# Patient Record
Sex: FEMALE | Race: WHITE | NOT HISPANIC OR LATINO
[De-identification: names, ages, dates, MRNs, and addresses within clinical notes are randomized per-mention and may not be internally consistent; named-entity substitution may affect disease eponyms.]

---

## 2017-09-26 ENCOUNTER — APPOINTMENT (OUTPATIENT)
Dept: ORTHOPEDIC SURGERY | Facility: CLINIC | Age: 82
End: 2017-09-26
Payer: MEDICARE

## 2017-09-26 VITALS — BODY MASS INDEX: 29.88 KG/M2 | HEIGHT: 64 IN | WEIGHT: 175 LBS

## 2017-09-26 VITALS — DIASTOLIC BLOOD PRESSURE: 80 MMHG | HEART RATE: 68 BPM | SYSTOLIC BLOOD PRESSURE: 159 MMHG

## 2017-09-26 DIAGNOSIS — M54.16 RADICULOPATHY, LUMBAR REGION: ICD-10-CM

## 2017-09-26 DIAGNOSIS — Z78.9 OTHER SPECIFIED HEALTH STATUS: ICD-10-CM

## 2017-09-26 DIAGNOSIS — Z56.0 UNEMPLOYMENT, UNSPECIFIED: ICD-10-CM

## 2017-09-26 DIAGNOSIS — Z60.2 PROBLEMS RELATED TO LIVING ALONE: ICD-10-CM

## 2017-09-26 PROCEDURE — 20610 DRAIN/INJ JOINT/BURSA W/O US: CPT | Mod: LT

## 2017-09-26 PROCEDURE — 99214 OFFICE O/P EST MOD 30 MIN: CPT | Mod: 25

## 2017-09-26 PROCEDURE — 72190 X-RAY EXAM OF PELVIS: CPT

## 2017-09-26 SDOH — ECONOMIC STABILITY - INCOME SECURITY: UNEMPLOYMENT, UNSPECIFIED: Z56.0

## 2017-09-26 SDOH — SOCIAL STABILITY - SOCIAL INSECURITY: PROBLEMS RELATED TO LIVING ALONE: Z60.2

## 2019-07-29 ENCOUNTER — APPOINTMENT (OUTPATIENT)
Dept: ORTHOPEDIC SURGERY | Facility: CLINIC | Age: 84
End: 2019-07-29
Payer: MEDICARE

## 2019-07-29 VITALS — HEIGHT: 64 IN | WEIGHT: 170 LBS | BODY MASS INDEX: 29.02 KG/M2

## 2019-07-29 DIAGNOSIS — M70.61 TROCHANTERIC BURSITIS, RIGHT HIP: ICD-10-CM

## 2019-07-29 PROCEDURE — 99213 OFFICE O/P EST LOW 20 MIN: CPT

## 2019-07-29 RX ORDER — AZELASTINE HYDROCHLORIDE 137 UG/1
137 SPRAY, METERED NASAL
Qty: 90 | Refills: 0 | Status: DISCONTINUED | COMMUNITY
Start: 2019-04-29

## 2019-07-29 RX ORDER — MUPIROCIN 20 MG/G
2 OINTMENT TOPICAL
Qty: 22 | Refills: 0 | Status: DISCONTINUED | COMMUNITY
Start: 2019-04-08

## 2019-07-29 RX ORDER — METHYLPREDNISOLONE 4 MG/1
4 TABLET ORAL
Qty: 21 | Refills: 0 | Status: DISCONTINUED | COMMUNITY
Start: 2019-03-11

## 2019-07-29 RX ORDER — AMOXICILLIN AND CLAVULANATE POTASSIUM 875; 125 MG/1; MG/1
875-125 TABLET, COATED ORAL
Qty: 20 | Refills: 0 | Status: DISCONTINUED | COMMUNITY
Start: 2019-04-08

## 2019-08-21 ENCOUNTER — APPOINTMENT (OUTPATIENT)
Dept: ORTHOPEDIC SURGERY | Facility: CLINIC | Age: 84
End: 2019-08-21

## 2019-10-02 PROBLEM — Z60.2 PERSON LIVING ALONE: Status: ACTIVE | Noted: 2017-09-26

## 2021-03-16 ENCOUNTER — INPATIENT (INPATIENT)
Facility: HOSPITAL | Age: 86
LOS: 2 days | Discharge: HOME | DRG: 149 | End: 2021-03-19
Attending: HOSPITALIST | Admitting: STUDENT IN AN ORGANIZED HEALTH CARE EDUCATION/TRAINING PROGRAM
Payer: MEDICARE

## 2021-03-16 VITALS
DIASTOLIC BLOOD PRESSURE: 94 MMHG | WEIGHT: 160.06 LBS | SYSTOLIC BLOOD PRESSURE: 165 MMHG | OXYGEN SATURATION: 97 % | HEIGHT: 64 IN | TEMPERATURE: 98 F | RESPIRATION RATE: 20 BRPM | HEART RATE: 75 BPM

## 2021-03-16 DIAGNOSIS — R42 DIZZINESS AND GIDDINESS: ICD-10-CM

## 2021-03-16 LAB
ALBUMIN SERPL ELPH-MCNC: 4 G/DL — SIGNIFICANT CHANGE UP (ref 3.3–5)
ALP SERPL-CCNC: 27 U/L — LOW (ref 40–120)
ALT FLD-CCNC: 13 U/L — SIGNIFICANT CHANGE UP (ref 10–45)
ANION GAP SERPL CALC-SCNC: 11 MMOL/L — SIGNIFICANT CHANGE UP (ref 5–17)
APPEARANCE UR: CLEAR — SIGNIFICANT CHANGE UP
APTT BLD: 32.4 SEC — SIGNIFICANT CHANGE UP (ref 27.5–35.5)
AST SERPL-CCNC: 27 U/L — SIGNIFICANT CHANGE UP (ref 10–40)
BASOPHILS # BLD AUTO: 0.03 K/UL — SIGNIFICANT CHANGE UP (ref 0–0.2)
BASOPHILS NFR BLD AUTO: 0.5 % — SIGNIFICANT CHANGE UP (ref 0–2)
BILIRUB SERPL-MCNC: 0.7 MG/DL — SIGNIFICANT CHANGE UP (ref 0.2–1.2)
BILIRUB UR-MCNC: NEGATIVE — SIGNIFICANT CHANGE UP
BUN SERPL-MCNC: 23 MG/DL — SIGNIFICANT CHANGE UP (ref 7–23)
CALCIUM SERPL-MCNC: 9.9 MG/DL — SIGNIFICANT CHANGE UP (ref 8.4–10.5)
CHLORIDE SERPL-SCNC: 107 MMOL/L — SIGNIFICANT CHANGE UP (ref 96–108)
CO2 SERPL-SCNC: 22 MMOL/L — SIGNIFICANT CHANGE UP (ref 22–31)
COLOR SPEC: COLORLESS — SIGNIFICANT CHANGE UP
CREAT SERPL-MCNC: 1.14 MG/DL — SIGNIFICANT CHANGE UP (ref 0.5–1.3)
DIFF PNL FLD: NEGATIVE — SIGNIFICANT CHANGE UP
EOSINOPHIL # BLD AUTO: 0.06 K/UL — SIGNIFICANT CHANGE UP (ref 0–0.5)
EOSINOPHIL NFR BLD AUTO: 1 % — SIGNIFICANT CHANGE UP (ref 0–6)
GLUCOSE BLDC GLUCOMTR-MCNC: 95 MG/DL — SIGNIFICANT CHANGE UP (ref 70–99)
GLUCOSE SERPL-MCNC: 100 MG/DL — HIGH (ref 70–99)
GLUCOSE UR QL: NEGATIVE — SIGNIFICANT CHANGE UP
HCT VFR BLD CALC: 39.9 % — SIGNIFICANT CHANGE UP (ref 34.5–45)
HGB BLD-MCNC: 12.4 G/DL — SIGNIFICANT CHANGE UP (ref 11.5–15.5)
IMM GRANULOCYTES NFR BLD AUTO: 0.3 % — SIGNIFICANT CHANGE UP (ref 0–1.5)
INR BLD: 1.11 RATIO — SIGNIFICANT CHANGE UP (ref 0.88–1.16)
KETONES UR-MCNC: NEGATIVE — SIGNIFICANT CHANGE UP
LEUKOCYTE ESTERASE UR-ACNC: NEGATIVE — SIGNIFICANT CHANGE UP
LYMPHOCYTES # BLD AUTO: 1.08 K/UL — SIGNIFICANT CHANGE UP (ref 1–3.3)
LYMPHOCYTES # BLD AUTO: 18.5 % — SIGNIFICANT CHANGE UP (ref 13–44)
MAGNESIUM SERPL-MCNC: 2 MG/DL — SIGNIFICANT CHANGE UP (ref 1.6–2.6)
MCHC RBC-ENTMCNC: 25.4 PG — LOW (ref 27–34)
MCHC RBC-ENTMCNC: 31.1 GM/DL — LOW (ref 32–36)
MCV RBC AUTO: 81.8 FL — SIGNIFICANT CHANGE UP (ref 80–100)
MONOCYTES # BLD AUTO: 0.52 K/UL — SIGNIFICANT CHANGE UP (ref 0–0.9)
MONOCYTES NFR BLD AUTO: 8.9 % — SIGNIFICANT CHANGE UP (ref 2–14)
NEUTROPHILS # BLD AUTO: 4.12 K/UL — SIGNIFICANT CHANGE UP (ref 1.8–7.4)
NEUTROPHILS NFR BLD AUTO: 70.8 % — SIGNIFICANT CHANGE UP (ref 43–77)
NITRITE UR-MCNC: NEGATIVE — SIGNIFICANT CHANGE UP
NRBC # BLD: 0 /100 WBCS — SIGNIFICANT CHANGE UP (ref 0–0)
NT-PROBNP SERPL-SCNC: 93 PG/ML — SIGNIFICANT CHANGE UP (ref 0–300)
PH UR: 6.5 — SIGNIFICANT CHANGE UP (ref 5–8)
PHOSPHATE SERPL-MCNC: 3.3 MG/DL — SIGNIFICANT CHANGE UP (ref 2.5–4.5)
PLATELET # BLD AUTO: 152 K/UL — SIGNIFICANT CHANGE UP (ref 150–400)
POTASSIUM SERPL-MCNC: 5.2 MMOL/L — SIGNIFICANT CHANGE UP (ref 3.5–5.3)
POTASSIUM SERPL-SCNC: 5.2 MMOL/L — SIGNIFICANT CHANGE UP (ref 3.5–5.3)
PROT SERPL-MCNC: 7 G/DL — SIGNIFICANT CHANGE UP (ref 6–8.3)
PROT UR-MCNC: NEGATIVE — SIGNIFICANT CHANGE UP
PROTHROM AB SERPL-ACNC: 13.3 SEC — SIGNIFICANT CHANGE UP (ref 10.6–13.6)
RBC # BLD: 4.88 M/UL — SIGNIFICANT CHANGE UP (ref 3.8–5.2)
RBC # FLD: 14.7 % — HIGH (ref 10.3–14.5)
SODIUM SERPL-SCNC: 140 MMOL/L — SIGNIFICANT CHANGE UP (ref 135–145)
SP GR SPEC: 1.02 — SIGNIFICANT CHANGE UP (ref 1.01–1.02)
TROPONIN T, HIGH SENSITIVITY RESULT: 22 NG/L — SIGNIFICANT CHANGE UP (ref 0–51)
UROBILINOGEN FLD QL: NEGATIVE — SIGNIFICANT CHANGE UP
WBC # BLD: 5.83 K/UL — SIGNIFICANT CHANGE UP (ref 3.8–10.5)
WBC # FLD AUTO: 5.83 K/UL — SIGNIFICANT CHANGE UP (ref 3.8–10.5)

## 2021-03-16 PROCEDURE — 70498 CT ANGIOGRAPHY NECK: CPT | Mod: 26,MA

## 2021-03-16 PROCEDURE — 99223 1ST HOSP IP/OBS HIGH 75: CPT | Mod: GC

## 2021-03-16 PROCEDURE — 71045 X-RAY EXAM CHEST 1 VIEW: CPT | Mod: 26

## 2021-03-16 PROCEDURE — 99285 EMERGENCY DEPT VISIT HI MDM: CPT | Mod: CS,GC

## 2021-03-16 PROCEDURE — 70496 CT ANGIOGRAPHY HEAD: CPT | Mod: 26,MA

## 2021-03-16 PROCEDURE — 93010 ELECTROCARDIOGRAM REPORT: CPT | Mod: GC

## 2021-03-16 RX ORDER — DEXTROSE 50 % IN WATER 50 %
25 SYRINGE (ML) INTRAVENOUS ONCE
Refills: 0 | Status: DISCONTINUED | OUTPATIENT
Start: 2021-03-16 | End: 2021-03-19

## 2021-03-16 RX ORDER — SODIUM CHLORIDE 9 MG/ML
1000 INJECTION, SOLUTION INTRAVENOUS
Refills: 0 | Status: DISCONTINUED | OUTPATIENT
Start: 2021-03-16 | End: 2021-03-19

## 2021-03-16 RX ORDER — INSULIN LISPRO 100/ML
VIAL (ML) SUBCUTANEOUS
Refills: 0 | Status: DISCONTINUED | OUTPATIENT
Start: 2021-03-16 | End: 2021-03-19

## 2021-03-16 RX ORDER — INSULIN LISPRO 100/ML
VIAL (ML) SUBCUTANEOUS AT BEDTIME
Refills: 0 | Status: DISCONTINUED | OUTPATIENT
Start: 2021-03-16 | End: 2021-03-19

## 2021-03-16 RX ORDER — GLUCAGON INJECTION, SOLUTION 0.5 MG/.1ML
1 INJECTION, SOLUTION SUBCUTANEOUS ONCE
Refills: 0 | Status: DISCONTINUED | OUTPATIENT
Start: 2021-03-16 | End: 2021-03-19

## 2021-03-16 RX ORDER — DEXTROSE 50 % IN WATER 50 %
15 SYRINGE (ML) INTRAVENOUS ONCE
Refills: 0 | Status: DISCONTINUED | OUTPATIENT
Start: 2021-03-16 | End: 2021-03-19

## 2021-03-16 RX ORDER — DEXTROSE 50 % IN WATER 50 %
12.5 SYRINGE (ML) INTRAVENOUS ONCE
Refills: 0 | Status: DISCONTINUED | OUTPATIENT
Start: 2021-03-16 | End: 2021-03-19

## 2021-03-16 NOTE — ED PROVIDER NOTE - PHYSICAL EXAMINATION
GENERAL: no acute distress, non-toxic appearing  HEAD: normocephalic, atraumatic  HEENT: normal conjunctiva, oral mucosa moist, neck supple  CARDIAC: regular rate and rhythm, normal S1 and S2,  no appreciable murmurs  PULM: clear to ascultation bilaterally, no crackles, rales, rhonchi, or wheezing  GI: abdomen nondistended, soft, nontender, no guarding or rebound tenderness  : no CVA tenderness, no suprapubic tenderness    NEURO: CN2-12 grossly intact, A&Ox4, MS +5/5 in UE and LE BL, finger to nose smooth and rapid, gross sensation intact in UE and LE BL, negative pronator drift, unsteady gait      MSK: no visible deformities, no peripheral edema, calf tenderness/redness/swelling  SKIN: no visible rashes, dry, well-perfused  PSYCH: appropriate mood and affect

## 2021-03-16 NOTE — H&P ADULT - PROBLEM SELECTOR PLAN 4
- c/w home lisinopril 5mg and metoprolol 25mg - CHADSVASC: 5 (Age, sex, HTN, diabetes)  - c/w xarelto 15mg

## 2021-03-16 NOTE — ED ADULT NURSE NOTE - OBJECTIVE STATEMENT
88 y/o F A&Ox3 PMH spinal stenosis, osteoporosis, hypothyroid, HTN, HLD total knee replacement presents to the ED from home c/o dizziness. Pt reports feeling one episode of dizziness & weakness approx 2 weeks ago. Pt states the episode spontaneously resolved. Pt states this morning she had a similar episode where she became dizzy & weak. pt states this episode lasted longer than the previous one. Pt PCP advised pt to be seen in the ED for further treatment & evaluation. Pt has pacemaker, pt stated outpatient MD told patient something "happened with her pacemaker during both episodes". Upon arrival to ED pt is well appearing. Breathing is even and unlabored, satting 100% RA. Skin is warm, dry & intact. EKG completed at bedside, placed on CM- NSR. Pt denies CP, SOB, N/V/D, numbness, tingling, fevers, chills. IV access obtained. Call bell within reach, comfort & safety provided.

## 2021-03-16 NOTE — ED PROVIDER NOTE - NS ED ROS FT
GENERAL: no fever, chills, (+) dizziness   HEENT: no cough, congestion, odynophagia, dysphagia  CARDIAC: no chest pain, palpitations, lightheadedness  PULM: no dyspnea, wheezing   GI: no abdominal pain, nausea, vomiting, diarrhea, constipation, melena, hematochezia  : no urinary dysuria, frequency, incontinence, hematuria  NEURO: no headache, motor weakness, sensory changes  MSK: no joint or muscle pain  SKIN: no rashes, hives  HEME: no active bleeding, bruising

## 2021-03-16 NOTE — H&P ADULT - ATTENDING COMMENTS
I was physically present for the key portions of the evaluation and management (E/M) service provided.  I agree with the above history, physical, and plan which I have reviewed and edited where appropriate.     Plan discussed with Patient, RN    89 yo F with a pmhx of DM, HTN, hyperlipidemia, asthma, afib on eliquis, ILR tachy-inocencia syndrome s/p PPM, hypothyroidism presenting with complaints of weakness and dizziness likely 2/2 to arrhythmia (intermittent SVT).  Monitor electrolytes, check TSH.  Monitor on telemetry.  Cardiology consult in AM.

## 2021-03-16 NOTE — H&P ADULT - ASSESSMENT
89 yo F with a pmhx of DM, HTN, hyperlipidemia, asthma, afib on eliquis, ILR tachy-inocencia syndrome s/p PPM, hypothyroidism presenting with complaints of weakness and dizziness for the past week.  89 yo F with a pmhx of DM, HTN, hyperlipidemia, asthma, afib on eliquis, ILR tachy-inocencia syndrome s/p PPM, hypothyroidism presenting with complaints of weakness and dizziness for the past week. Found to have multiple runs of SVT on outpatient cardiologist device interrogation.

## 2021-03-16 NOTE — ED PROVIDER NOTE - OBJECTIVE STATEMENT
88 yo F with a pmhx of DM, HTN, hyperlipidemia, asthma, afib on eliquis, ILR tachy-inocencia syndrome s/p PPM, hypothyroidism here for dizziness over the past week. She had prior episodes of dizziness; cannot identify any triggers. She describes her dizziness as the room spinning around her. She admits to associated nausea with the episodes. She denies any CP, SOB, abdominal pain, V/D, fnd, numbness or tingling.  She had outpatient interrogation performed by Diaz Goddard which demonstrated:   "multiple runs of SVT at 170 bpm longest 58 seconds on pacemakers. patient is not orthostatic"

## 2021-03-16 NOTE — ED PROVIDER NOTE - CLINICAL SUMMARY MEDICAL DECISION MAKING FREE TEXT BOX
88 yo F with a pmhx of DM, HTN, hyperlipidemia, asthma, afib on eliquis, ILR tachy-inocencia syndrome s/p PPM, hypothyroidism here for dizziness over the past week. intermittent episodes without any idenifiable triggers. VSS. neuro exam is remarkable for unsteady gait. other parts of neuro exam is unremarkable. no pronator drift, no weakness, no sensation changes. dizziness likely related to cardiac origin, will order CTA head/neck to r/o cranial pathology. labs, TBA

## 2021-03-16 NOTE — ED PROVIDER NOTE - ATTENDING CONTRIBUTION TO CARE
Patient is an 89 yo F with history of DM, HTN, hyperlipidemia, asthma, afib, ILR tachy-inocencia syndrome s/p PPM, hypothyroidism here for dizziness over the past week. She was evaluated by Dr. Diaz Goddard, found patient to have multiple runs of SVTs to 170 bpm. Patient was sent in for evaluation for Watchman's device due to near syncopal episodes.     VS noted  Gen. no acute distress, Non toxic   HEENT: EOMI, mmm  Lungs: CTAB/L no C/ W /R   CVS: RRR   Abd; Soft non tender, non distended   Ext: no edema  Skin: no rash  Neuro AAOx3 non focal clear speech  a/p:   - Oliverio IGLESIAS Patient is an 89 yo F with history of DM, HTN, hyperlipidemia, asthma, afib, ILR tachy-inocencia syndrome s/p PPM, hypothyroidism here for dizziness over the past week. She was evaluated by Dr. Diaz Goddard, found patient to have multiple runs of SVTs to 170 bpm. Patient was sent in for evaluation for Watchman's device due to near syncopal episodes.     VS noted  Gen. no acute distress, Non toxic   HEENT: EOMI, mmm  Lungs: CTAB/L no C/ W /R   CVS: RRR   Abd; Soft non tender, non distended   Ext: no edema  Skin: no rash  Neuro AAOx3, CN 2-12 intact, strength equal in UE/LE, gait unsteady, clear speech  a/p: dizziness - run of SVTS - based on interrogation done by Dr. Goddard in the office. However, patient with unsteady gait for a few days. At baseline, walks without assistance. Plan for labs, CT Head, CTA H&N.   - Oliverio IGLESIAS Patient is an 88 yo F with history of DM, HTN, hyperlipidemia, asthma, afib, ILR tachy-inocencia syndrome s/p PPM, hypothyroidism here for dizziness over the past week. She was evaluated by Dr. Diaz Goddard, found patient to have multiple runs of SVTs to 170 bpm. Patient was sent in for evaluation for Watchman's device due to near syncopal episodes.     VS noted  Gen. no acute distress, Non toxic   HEENT: EOMI, mmm  Lungs: CTAB/L no C/ W /R   CVS: RRR   Abd; Soft non tender, non distended   Ext: no edema  Skin: no rash  Neuro AAOx3, CN 2-12 intact, strength equal in UE/LE, gait unsteady, clear speech  a/p: dizziness - run of SVTS - based on interrogation done by Dr. Goddard in the office. However, patient with unsteady gait for a few days. At baseline, walks without assistance. r/o CVA, Plan for labs CT Head, CTA H&N.   - Oliverio IGLESIAS

## 2021-03-16 NOTE — ED PROVIDER NOTE - PMH
Asthma    Female Stress Incontinence    Hyperlipidemia    Hypertension    Hypothyroid    Lumbar Spinal Stenosis    Lumbosacral Neuritis    Osteoporosis    Osteoporosis    Pneumonia  11/10

## 2021-03-16 NOTE — ED ADULT NURSE NOTE - NSIMPLEMENTINTERV_GEN_ALL_ED
Implemented All Fall with Harm Risk Interventions:  [place] to call system. Call bell, personal items and telephone within reach. Instruct patient to call for assistance. Room bathroom lighting operational. Non-slip footwear when patient is off stretcher. Physically safe environment: no spills, clutter or unnecessary equipment. Stretcher in lowest position, wheels locked, appropriate side rails in place. Provide visual cue, wrist band, yellow gown, etc. Monitor gait and stability. Monitor for mental status changes and reorient to person, place, and time. Review medications for side effects contributing to fall risk. Reinforce activity limits and safety measures with patient and family. Provide visual clues: red socks.

## 2021-03-16 NOTE — H&P ADULT - NSHPLABSRESULTS_GEN_ALL_CORE
12.4   5.83  )-----------( 152      ( 16 Mar 2021 16:09 )             39.9       03-16    140  |  107  |  23  ----------------------------<  100<H>  5.2   |  22  |  1.14    Ca    9.9      16 Mar 2021 16:09  Phos  3.3     03-16  Mg     2.0     03-16    TPro  7.0  /  Alb  4.0  /  TBili  0.7  /  DBili  x   /  AST  27  /  ALT  13  /  AlkPhos  27<L>  03-16              Urinalysis Basic - ( 16 Mar 2021 17:44 )    Color: Colorless / Appearance: Clear / S.016 / pH: x  Gluc: x / Ketone: Negative  / Bili: Negative / Urobili: Negative   Blood: x / Protein: Negative / Nitrite: Negative   Leuk Esterase: Negative / RBC: x / WBC x   Sq Epi: x / Non Sq Epi: x / Bacteria: x        PT/INR - ( 16 Mar 2021 16:09 )   PT: 13.3 sec;   INR: 1.11 ratio         PTT - ( 16 Mar 2021 16:09 )  PTT:32.4 sec    Troponin: 22      POCT Blood Glucose.: 95 mg/dL (16 Mar 2021 23:32) 12.4   5.83  )-----------( 152      ( 16 Mar 2021 16:09 )             39.9       03-16    140  |  107  |  23  ----------------------------<  100<H>  5.2   |  22  |  1.14    Ca    9.9      16 Mar 2021 16:09  Phos  3.3     03-16  Mg     2.0     03-16    TPro  7.0  /  Alb  4.0  /  TBili  0.7  /  DBili  x   /  AST  27  /  ALT  13  /  AlkPhos  27<L>  03-16              Urinalysis Basic - ( 16 Mar 2021 17:44 )    Color: Colorless / Appearance: Clear / S.016 / pH: x  Gluc: x / Ketone: Negative  / Bili: Negative / Urobili: Negative   Blood: x / Protein: Negative / Nitrite: Negative   Leuk Esterase: Negative / RBC: x / WBC x   Sq Epi: x / Non Sq Epi: x / Bacteria: x        PT/INR - ( 16 Mar 2021 16:09 )   PT: 13.3 sec;   INR: 1.11 ratio         PTT - ( 16 Mar 2021 16:09 )  PTT:32.4 sec    Troponin: 22  BNP: 93    POCT Blood Glucose.: 95 mg/dL (16 Mar 2021 23:32)    < from: CT Head No Cont (21 @ 17:19) >    FINDINGS:    CT BRAIN:    No acute intracranial hemorrhage, mass effect, or midline shift. No abnormal extra-axial fluid collections. No acute cerebral cortical infarct. The basal cisterns are patent without evidence of central herniation. No hydrocephalus.    Moderate cerebral volume loss with proportional prominence of the sulci and ventricles. Patchy periventricular white matter hypoattenuation, likely related to chronic microvascular ischemic disease.    The calvarium is intact. The soft tissues of the scalp are unremarkable.  The visualized paranasal sinuses are clear. The mastoid air cells are clear.      CT ANGIOGRAPHY NECK:    Three-vessel aortic arch. The origins of the great vessels are unremarkable. The common carotid arteries are normal in caliber, significant stenosis.    The carotid bifurcations demonstrate mild atherosclerotic changes. The internal carotid arteries demonstrate mild atherosclerotic changes, with less than 50% stenosis.    Co-dominant vertebral arteries. The vertebral arteries are normal in caliber without significant stenosis. Left side dominant.    Single cyst within the left upper lobe (series 2, image 29). 1.0 x 0.8 cm right thyroid lobe nodule (series 2, image 57). Subcentimeter mediastinal lymph node (series 2, image 22). Visualized osseous structuresare unremarkable.      CT ANGIOGRAPHY BRAIN:    Anterior circulation: Calcifications of the carotid siphons bilaterally. The bilateral anterior cerebral and middle cerebral arteries are patent without evidence of significant stenosis, major vessel occlusion, or aneurysm.    Posterior circulation: The distal vertebral arteries, basilar artery, and bilateral posterior cerebral arteries are patent without evidence of significant stenosis, major vessel occlusion, or aneurysm.    No enlarged vascular lesions or clusters of abnormal vessels are noted to suggest an arterial venous malformation.    Visualized portions of the superficial and deep venous systems are unremarkable.      IMPRESSION:    CT brain: No acute intracranial hemorrhage, mass effect, midline shift or acute cerebral cortical infarct.    CT angiography neck: Less than 50% bilaterally ICA stenosis by NASCET criteria. No vascular dissection. 1.0 x 0.8 cm right thyroid lobe nodule. Recommend nonemergent thyroid ultrasound as clinically warranted    CT angiography brain: No major vessel occlusion or proximal stenosis. No evidence of aneurysm or other vascular malformation.    < end of copied text > 12.4   5.83  )-----------( 152      ( 16 Mar 2021 16:09 )             39.9       03-16    140  |  107  |  23  ----------------------------<  100<H>  5.2   |  22  |  1.14    Ca    9.9      16 Mar 2021 16:09  Phos  3.3     03-16  Mg     2.0     -16    TPro  7.0  /  Alb  4.0  /  TBili  0.7  /  DBili  x   /  AST  27  /  ALT  13  /  AlkPhos  27<L>  03-16              Urinalysis Basic - ( 16 Mar 2021 17:44 )    Color: Colorless / Appearance: Clear / S.016 / pH: x  Gluc: x / Ketone: Negative  / Bili: Negative / Urobili: Negative   Blood: x / Protein: Negative / Nitrite: Negative   Leuk Esterase: Negative / RBC: x / WBC x   Sq Epi: x / Non Sq Epi: x / Bacteria: x        PT/INR - ( 16 Mar 2021 16:09 )   PT: 13.3 sec;   INR: 1.11 ratio         PTT - ( 16 Mar 2021 16:09 )  PTT:32.4 sec    Troponin: 22  BNP: 93    POCT Blood Glucose.: 95 mg/dL (16 Mar 2021 23:32)    EKG: NSR,     < from: CT Head No Cont (21 @ 17:19) >    FINDINGS:    CT BRAIN:    No acute intracranial hemorrhage, mass effect, or midline shift. No abnormal extra-axial fluid collections. No acute cerebral cortical infarct. The basal cisterns are patent without evidence of central herniation. No hydrocephalus.    Moderate cerebral volume loss with proportional prominence of the sulci and ventricles. Patchy periventricular white matter hypoattenuation, likely related to chronic microvascular ischemic disease.    The calvarium is intact. The soft tissues of the scalp are unremarkable.  The visualized paranasal sinuses are clear. The mastoid air cells are clear.      CT ANGIOGRAPHY NECK:    Three-vessel aortic arch. The origins of the great vessels are unremarkable. The common carotid arteries are normal in caliber, significant stenosis.    The carotid bifurcations demonstrate mild atherosclerotic changes. The internal carotid arteries demonstrate mild atherosclerotic changes, with less than 50% stenosis.    Co-dominant vertebral arteries. The vertebral arteries are normal in caliber without significant stenosis. Left side dominant.    Single cyst within the left upper lobe (series 2, image 29). 1.0 x 0.8 cm right thyroid lobe nodule (series 2, image 57). Subcentimeter mediastinal lymph node (series 2, image 22). Visualized osseous structuresare unremarkable.      CT ANGIOGRAPHY BRAIN:    Anterior circulation: Calcifications of the carotid siphons bilaterally. The bilateral anterior cerebral and middle cerebral arteries are patent without evidence of significant stenosis, major vessel occlusion, or aneurysm.    Posterior circulation: The distal vertebral arteries, basilar artery, and bilateral posterior cerebral arteries are patent without evidence of significant stenosis, major vessel occlusion, or aneurysm.    No enlarged vascular lesions or clusters of abnormal vessels are noted to suggest an arterial venous malformation.    Visualized portions of the superficial and deep venous systems are unremarkable.      IMPRESSION:    CT brain: No acute intracranial hemorrhage, mass effect, midline shift or acute cerebral cortical infarct.    CT angiography neck: Less than 50% bilaterally ICA stenosis by NASCET criteria. No vascular dissection. 1.0 x 0.8 cm right thyroid lobe nodule. Recommend nonemergent thyroid ultrasound as clinically warranted    CT angiography brain: No major vessel occlusion or proximal stenosis. No evidence of aneurysm or other vascular malformation.    < end of copied text > 12.4   5.83  )-----------( 152      ( 16 Mar 2021 16:09 )             39.9       03-16    140  |  107  |  23  ----------------------------<  100<H>  5.2   |  22  |  1.14    Ca    9.9      16 Mar 2021 16:09  Phos  3.3     03-16  Mg     2.0     03-16    TPro  7.0  /  Alb  4.0  /  TBili  0.7  /  DBili  x   /  AST  27  /  ALT  13  /  AlkPhos  27<L>  03-16              Urinalysis Basic - ( 16 Mar 2021 17:44 )    Color: Colorless / Appearance: Clear / S.016 / pH: x  Gluc: x / Ketone: Negative  / Bili: Negative / Urobili: Negative   Blood: x / Protein: Negative / Nitrite: Negative   Leuk Esterase: Negative / RBC: x / WBC x   Sq Epi: x / Non Sq Epi: x / Bacteria: x        PT/INR - ( 16 Mar 2021 16:09 )   PT: 13.3 sec;   INR: 1.11 ratio         PTT - ( 16 Mar 2021 16:09 )  PTT:32.4 sec    Troponin: 22  BNP: 93    POCT Blood Glucose.: 95 mg/dL (16 Mar 2021 23:32)    EKG: NSR    < from: CT Head No Cont (21 @ 17:19) >    FINDINGS:    CT BRAIN:    No acute intracranial hemorrhage, mass effect, or midline shift. No abnormal extra-axial fluid collections. No acute cerebral cortical infarct. The basal cisterns are patent without evidence of central herniation. No hydrocephalus.    Moderate cerebral volume loss with proportional prominence of the sulci and ventricles. Patchy periventricular white matter hypoattenuation, likely related to chronic microvascular ischemic disease.    The calvarium is intact. The soft tissues of the scalp are unremarkable.  The visualized paranasal sinuses are clear. The mastoid air cells are clear.      CT ANGIOGRAPHY NECK:    Three-vessel aortic arch. The origins of the great vessels are unremarkable. The common carotid arteries are normal in caliber, significant stenosis.    The carotid bifurcations demonstrate mild atherosclerotic changes. The internal carotid arteries demonstrate mild atherosclerotic changes, with less than 50% stenosis.    Co-dominant vertebral arteries. The vertebral arteries are normal in caliber without significant stenosis. Left side dominant.    Single cyst within the left upper lobe (series 2, image 29). 1.0 x 0.8 cm right thyroid lobe nodule (series 2, image 57). Subcentimeter mediastinal lymph node (series 2, image 22). Visualized osseous structuresare unremarkable.      CT ANGIOGRAPHY BRAIN:    Anterior circulation: Calcifications of the carotid siphons bilaterally. The bilateral anterior cerebral and middle cerebral arteries are patent without evidence of significant stenosis, major vessel occlusion, or aneurysm.    Posterior circulation: The distal vertebral arteries, basilar artery, and bilateral posterior cerebral arteries are patent without evidence of significant stenosis, major vessel occlusion, or aneurysm.    No enlarged vascular lesions or clusters of abnormal vessels are noted to suggest an arterial venous malformation.    Visualized portions of the superficial and deep venous systems are unremarkable.      IMPRESSION:    CT brain: No acute intracranial hemorrhage, mass effect, midline shift or acute cerebral cortical infarct.    CT angiography neck: Less than 50% bilaterally ICA stenosis by NASCET criteria. No vascular dissection. 1.0 x 0.8 cm right thyroid lobe nodule. Recommend nonemergent thyroid ultrasound as clinically warranted    CT angiography brain: No major vessel occlusion or proximal stenosis. No evidence of aneurysm or other vascular malformation.    < end of copied text > Labs, imaging, EKG reviewed    12.4   5.83  )-----------( 152      ( 16 Mar 2021 16:09 )             39.9       03-16    140  |  107  |  23  ----------------------------<  100<H>  5.2   |  22  |  1.14    Ca    9.9      16 Mar 2021 16:09  Phos  3.3     03-16  Mg     2.0     -16    TPro  7.0  /  Alb  4.0  /  TBili  0.7  /  DBili  x   /  AST  27  /  ALT  13  /  AlkPhos  27<L>  03-16              Urinalysis Basic - ( 16 Mar 2021 17:44 )    Color: Colorless / Appearance: Clear / S.016 / pH: x  Gluc: x / Ketone: Negative  / Bili: Negative / Urobili: Negative   Blood: x / Protein: Negative / Nitrite: Negative   Leuk Esterase: Negative / RBC: x / WBC x   Sq Epi: x / Non Sq Epi: x / Bacteria: x        PT/INR - ( 16 Mar 2021 16:09 )   PT: 13.3 sec;   INR: 1.11 ratio         PTT - ( 16 Mar 2021 16:09 )  PTT:32.4 sec    Troponin: 22  BNP: 93    POCT Blood Glucose.: 95 mg/dL (16 Mar 2021 23:32)    EKG: NSR    < from: CT Head No Cont (21 @ 17:19) >    FINDINGS:    CT BRAIN:    No acute intracranial hemorrhage, mass effect, or midline shift. No abnormal extra-axial fluid collections. No acute cerebral cortical infarct. The basal cisterns are patent without evidence of central herniation. No hydrocephalus.    Moderate cerebral volume loss with proportional prominence of the sulci and ventricles. Patchy periventricular white matter hypoattenuation, likely related to chronic microvascular ischemic disease.    The calvarium is intact. The soft tissues of the scalp are unremarkable.  The visualized paranasal sinuses are clear. The mastoid air cells are clear.      CT ANGIOGRAPHY NECK:    Three-vessel aortic arch. The origins of the great vessels are unremarkable. The common carotid arteries are normal in caliber, significant stenosis.    The carotid bifurcations demonstrate mild atherosclerotic changes. The internal carotid arteries demonstrate mild atherosclerotic changes, with less than 50% stenosis.    Co-dominant vertebral arteries. The vertebral arteries are normal in caliber without significant stenosis. Left side dominant.    Single cyst within the left upper lobe (series 2, image 29). 1.0 x 0.8 cm right thyroid lobe nodule (series 2, image 57). Subcentimeter mediastinal lymph node (series 2, image 22). Visualized osseous structuresare unremarkable.      CT ANGIOGRAPHY BRAIN:    Anterior circulation: Calcifications of the carotid siphons bilaterally. The bilateral anterior cerebral and middle cerebral arteries are patent without evidence of significant stenosis, major vessel occlusion, or aneurysm.    Posterior circulation: The distal vertebral arteries, basilar artery, and bilateral posterior cerebral arteries are patent without evidence of significant stenosis, major vessel occlusion, or aneurysm.    No enlarged vascular lesions or clusters of abnormal vessels are noted to suggest an arterial venous malformation.    Visualized portions of the superficial and deep venous systems are unremarkable.      IMPRESSION:    CT brain: No acute intracranial hemorrhage, mass effect, midline shift or acute cerebral cortical infarct.    CT angiography neck: Less than 50% bilaterally ICA stenosis by NASCET criteria. No vascular dissection. 1.0 x 0.8 cm right thyroid lobe nodule. Recommend nonemergent thyroid ultrasound as clinically warranted    CT angiography brain: No major vessel occlusion or proximal stenosis. No evidence of aneurysm or other vascular malformation.    < end of copied text >

## 2021-03-16 NOTE — H&P ADULT - PROBLEM SELECTOR PLAN 6
- c/w home levothyroxine  - f/u TSH in AM - Patient on Jardiance at home. Will reach out to pharmacy in AM for dosage  - low ISS for now  - f/u A1c in AM.

## 2021-03-16 NOTE — H&P ADULT - PROBLEM SELECTOR PLAN 1
-SVT noted on OP cardiology interrogation.   - f/u cardiology consult -SVT noted on OP cardiology interrogation. Patient subsequently referred to ED.   - c/w telemetry monitoring  - f/u cardiology consult ( Dr. Moo Hills, referred by Dr. Diaz Goddard) - Endorses periodic dizziness and presyncope. Like etiology 2/2 runs of SVTs on interrogation by OP cardiologist, etiology is likely cardiac in nature. Differential also includes orthostatis vs vertigo. CT imaging w/o evidence of acute hemorrhagic or ischemic stroke.   - orthostatic vitals  - fall precautions  - c/w telemetry monitoring  - cardiology consult (Dr. Moo [place], private cardiology)

## 2021-03-16 NOTE — PATIENT PROFILE ADULT - NSPROHMSYMPCOND_GEN_A_NUR
at bedside. Patient states they already watched discharge education video. Reviewed prevention of constipation side effect  from narcotics. Teachback done again on ankle pumps and incentive spriometry use.  Reviewed dressing changes, showering and c cardiovascular/diabetes

## 2021-03-16 NOTE — H&P ADULT - PROBLEM SELECTOR PLAN 5
- Patient on Jardiance at home. Will reach out to pharmacy in AM for dosage  - low ISS for now  - f/u A1c in AM. - c/w home lisinopril 5mg and metoprolol 25mg

## 2021-03-16 NOTE — H&P ADULT - NSHPPHYSICALEXAM_GEN_ALL_CORE
ICU Vital Signs Last 24 Hrs  T(C): 36.8 (16 Mar 2021 22:19), Max: 36.9 (16 Mar 2021 18:15)  T(F): 98.2 (16 Mar 2021 22:19), Max: 98.4 (16 Mar 2021 18:15)  HR: 74 (16 Mar 2021 22:19) (68 - 75)  BP: 142/83 (16 Mar 2021 22:19) (142/83 - 165/94)  BP(mean): --  ABP: --  ABP(mean): --  RR: 16 (16 Mar 2021 22:19) (16 - 20)  SpO2: 97% (16 Mar 2021 22:19) (97% - 100%) ICU Vital Signs Last 24 Hrs  T(C): 36.8 (16 Mar 2021 22:19), Max: 36.9 (16 Mar 2021 18:15)  T(F): 98.2 (16 Mar 2021 22:19), Max: 98.4 (16 Mar 2021 18:15)  HR: 74 (16 Mar 2021 22:19) (68 - 75)  BP: 142/83 (16 Mar 2021 22:19) (142/83 - 165/94)  BP(mean): --  ABP: --  ABP(mean): --  RR: 16 (16 Mar 2021 22:19) (16 - 20)  SpO2: 97% (16 Mar 2021 22:19) (97% - 100%)    PHYSICAL EXAM:  GENERAL: NAD, Resting in bed  HEENT:  Head atraumatic, EOMI, PERRLA, conjunctiva and sclera clear; Moist mucous membranes, normal oropharynx  NECK: Supple, No JVD, no lymphadenopathy, no thyroid nodules or enlargement  CHEST/LUNG: Clear to auscultation bilaterally; No rales, rhonchi, wheezing, or rubs. Unlabored respirations on room air  HEART: Regular rate and rhythm; No murmurs, rubs, or gallops  ABDOMEN: Bowel sounds present; Soft, Nontender, Nondistended. No hepatomegally  EXTREMITIES:  2+ Peripheral Pulses, brisk capillary refill. No clubbing, cyanosis, or edema  NERVOUS SYSTEM:  Alert & Oriented X3, non-focal and spontaneous movements of all extremities  SKIN: No rashes or lesions ICU Vital Signs Last 24 Hrs  T(C): 36.8 (16 Mar 2021 22:19), Max: 36.9 (16 Mar 2021 18:15)  T(F): 98.2 (16 Mar 2021 22:19), Max: 98.4 (16 Mar 2021 18:15)  HR: 74 (16 Mar 2021 22:19) (68 - 75)  BP: 142/83 (16 Mar 2021 22:19) (142/83 - 165/94)  BP(mean): --  ABP: --  ABP(mean): --  RR: 16 (16 Mar 2021 22:19) (16 - 20)  SpO2: 97% (16 Mar 2021 22:19) (97% - 100%)    PHYSICAL EXAM:  GENERAL: NAD, Resting in bed  HEENT:  Head atraumatic, PERRLA  NECK: Supple  CHEST/LUNG: Clear to auscultation bilaterally; No rales, rhonchi, wheezing, or rubs. Unlabored respirations on room air  HEART: Regular rate and rhythm; No murmurs, rubs, or gallops  ABDOMEN: Bowel sounds present; Soft, Nontender, Nondistended.   EXTREMITIES:  2+ Peripheral Pulses, brisk capillary refill. No clubbing, cyanosis, or edema  NERVOUS SYSTEM:  Alert & Oriented X3, non-focal and spontaneous movements of all extremities

## 2021-03-16 NOTE — H&P ADULT - NSHPREVIEWOFSYSTEMS_GEN_ALL_CORE
CONSTITUTIONAL:  No weight loss, fever, chills. +weakness  HEENT:  Eyes:  No visual loss, blurred vision, double vision or yellow sclerae.  SKIN:  No rash or itching.  CARDIOVASCULAR:  No chest pain, chest pressure or chest discomfort. No palpitations.  RESPIRATORY:  No shortness of breath, cough or sputum.  GASTROINTESTINAL:  No anorexia, nausea, vomiting or diarrhea. No abdominal pain or blood.  GENITOURINARY:  Denies hematuria, dysuria.   NEUROLOGICAL:  No headache, dizziness, syncope, paralysis, ataxia, numbness or tingling in the extremities. No change in bowel or bladder control.  MUSCULOSKELETAL:  No muscle, back pain, joint pain or stiffness.  HEMATOLOGIC:  No anemia, bleeding or bruising.  LYMPHATICS:  No enlarged nodes.   PSYCHIATRIC:  No history of depression or anxiety.  ENDOCRINOLOGIC:  No reports of sweating, cold or heat intolerance. No polyuria or polydipsia. CONSTITUTIONAL:  No weight loss, fever, chills. +weakness  HEENT:  Eyes:  No visual loss, blurred vision, double vision or yellow sclerae.  SKIN:  No rash or itching.  CARDIOVASCULAR:  No chest pain, chest pressure or chest discomfort. No palpitations.  RESPIRATORY:  No shortness of breath, cough or sputum.  GASTROINTESTINAL:  No anorexia, nausea, vomiting or diarrhea. No abdominal pain or blood.  GENITOURINARY:  Denies hematuria, dysuria.   NEUROLOGICAL:  No headache, syncope, paralysis, ataxia, numbness or tingling in the extremities. No change in bowel or bladder control. + dizziness,   MUSCULOSKELETAL:  No muscle, back pain, joint pain or stiffness.  HEMATOLOGIC:  No anemia, bleeding or bruising.  LYMPHATICS:  No enlarged nodes.   PSYCHIATRIC:  No history of depression or anxiety.  ENDOCRINOLOGIC:  No reports of sweating, cold or heat intolerance. No polyuria or polydipsia.

## 2021-03-16 NOTE — H&P ADULT - PROBLEM SELECTOR PLAN 7
- on rosuvastatin at home.  - c/w atorvastatin 40 QHS via therapeutic interchange -  History of hypothyroid.  - c/w home levothyroxine   - f/u TSH in AM    #Thyroid nodule   -1.0 x 0.8 cm right thyroid lobe nodule incidentally found on CT imaging.   -OP thyroid ultrasound.

## 2021-03-16 NOTE — ED CLERICAL - NS ED CLERK NOTE PRE-ARRIVAL INFORMATION; ADDITIONAL PRE-ARRIVAL INFORMATION
CC/Reason For referral: Near syncope/SVT  Preferred Consultant(if applicable):  Who admits for you (if needed):  Do you have documents you would like to fax over?  Would you still like to speak to an ED attending?

## 2021-03-16 NOTE — ED ADULT NURSE REASSESSMENT NOTE - NS ED NURSE REASSESS COMMENT FT1
Pt provided with dinner tray. Pt endorses no pain or discomfort at this time. Will continue to monitor.

## 2021-03-16 NOTE — ED ADULT NURSE REASSESSMENT NOTE - NS ED NURSE REASSESS COMMENT FT1
Report taken from COREEN Gold. Pt is resting comfortably in the doty way in stretcher. Pt denies any chest pain or dizziness at this time. Pt to be placed on portable cardiac monitor. Pt to ambulate to restroom. Pt is aware and in understanding of paln of care.

## 2021-03-16 NOTE — H&P ADULT - PROBLEM SELECTOR PLAN 2
- CT imaging w/o evidence of acute hemorrhagic or ischemic stroke. Due to runs of SVTs on interrogation by OP cardiologist, etiology is likely cardiac in nature.   - orthostatic vitals  - fall precautions  - c/w telemetry monitoring  - cardiology consult (Dr. Moo Hills, private cardiology) - Endorses periodic dizziness and presyncope. Like etiology 2/2 runs of SVTs on interrogation by OP cardiologist, etiology is likely cardiac in nature. Differential also includes orthostatis vs vertigo. CT imaging w/o evidence of acute hemorrhagic or ischemic stroke.   - orthostatic vitals  - fall precautions  - c/w telemetry monitoring  - cardiology consult (Dr. Moo Hills, private cardiology) -SVT noted on OP cardiology interrogation. Patient subsequently referred to ED.   - c/w telemetry monitoring  - f/u cardiology consult ( Dr. Moo Hills, referred by Dr. Diaz Goddard)

## 2021-03-16 NOTE — H&P ADULT - PROBLEM SELECTOR PLAN 3
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

## 2021-03-16 NOTE — H&P ADULT - HISTORY OF PRESENT ILLNESS
88 yo F with a pmhx of DM, HTN, hyperlipidemia, asthma, afib on eliquis, ILR tachy-inocencia syndrome s/p PPM, hypothyroidism presenting with complaints of weakness and dizziness for the past week.     	She had outpatient interrogation performed by Diaz Goddard which demonstrated:   "multiple runs of SVT at 170 bpm longest 58 seconds on pacemakers. patient is not orthostatic".    Patient follows with cardiologist Dr. Moo Hills.     In the ED, T: 98.4, HR 70, /90.   88 yo F with a pmhx of DM, HTN, hyperlipidemia, asthma, afib on eliquis, ILR tachy-inocencia syndrome s/p PPM, hypothyroidism presenting with complaints of weakness and dizziness for the past week. Patient states that she was in [place] last week visiting her daughter and felt dizzy and lightheaded. She states that this occurs regardless of whether she is sitting or standing. She states that she returned from [place] on Saturday and continued to feel weak and like she is "losing balance". She adds that it feels like the room is spinning. She states that on the morning on 3/16, she felt the dizziness to be at its worst and presented to her cardiologist Dr. Diaz Goddard's office. She states that she also felt that she was going to faint. Per note at bedside and over the phone with Dr. Goddard, orthostatic vitals were taken and BP was 150/90 both supine and standing. Dr. Goddard adds that   She denies CP, SOB, palpitations, subjective f/c, n/v.     	She had outpatient interrogation performed by Diaz Goddard which demonstrated:   "multiple runs of SVT at 170 bpm longest 58 seconds on pacemakers. patient is not orthostatic".    Patient follows with cardiologist Dr. Moo Hills.     In the ED, T: 98.4, HR 70, /90.   88 yo F with a pmhx of DM, HTN, hyperlipidemia, asthma, afib on eliquis, ILR tachy-inocencia syndrome s/p PPM, hypothyroidism presenting with complaints of weakness and dizziness for the past week. Patient states that she was in [place] last week visiting her daughter and felt dizzy and lightheaded. She states that this occurs regardless of whether she is sitting or standing. She states that she returned from [place] on Saturday and continued to feel weak and like she is "losing balance". She adds that it feels like the room is spinning. She states that on the morning on 3/16, she felt the dizziness to be at its worst and presented to her cardiologist Dr. Diaz Goddard's office. She states that she also felt that she was going to faint. Per note at bedside and over the phone with Dr. Goddard, orthostatic vitals were taken and BP was 150/90 both supine and standing. Dr. Goddard adds that multiple runs of SVT up to 170bpm were noted on the PPM that lasted for longer than 58 seconds.  Patient was then recommended to go to the ED for further evaluation. She denies CP, SOB, palpitations, subjective f/c, n/v.    In the ED, T: 98.4, HR 70, /90.   88 yo F with a pmhx of DM, HTN, hyperlipidemia, asthma, afib on eliquis, ILR tachy-inocencia syndrome s/p PPM, hypothyroidism presenting with complaints of weakness and dizziness for the past week. Patient was sent in from her OP cardiologist office after device interrogation revealed multiple runs of SVT up to 170bpm were noted on the PPM that lasted for longer than 58 seconds.     Patient states that she was in [place] last week visiting her daughter and felt dizzy and lightheaded. She states that this occurs regardless of whether she is sitting or standing. She states that she returned from [place] on Saturday and continued to feel weak and like she is "losing balance". She adds that it feels like the room is spinning. She states that on the morning on 3/16, she felt the dizziness to be at its worst and presented to her cardiologist Dr. Diaz Goddard's office. She states that she also felt that she was going to faint. Per note at bedside and over the phone with Dr. Goddard, orthostatic vitals were taken and BP was 150/90 both supine and standing. Dr. Goddard adds that multiple runs of SVT up to 170bpm were noted on the PPM that lasted for longer than 58 seconds.  Patient was then recommended to go to the ED for further evaluation. She denies CP, SOB, palpitations, subjective f/c, n/v.    In the ED, T: 98.4, HR 70, /90.   89 yo F with a pmhx of DM, HTN, hyperlipidemia, asthma, afib on eliquis, ILR tachy-inocencia syndrome s/p PPM, hypothyroidism presenting with complaints of weakness and dizziness for the past week. Patient was sent in from her OP cardiologist office after device interrogation revealed multiple runs of SVT up to 170bpm were noted on the PPM that lasted for longer than 58 seconds.     Patient states that she was in [place] last week visiting her daughter and felt dizzy and lightheaded. She states that this occurs regardless of whether she is sitting or standing. She states that she returned from [place] on Saturday and continued to feel weak and like she is "losing balance". She adds that it feels like the room is spinning and cannot recall any provoking factors to the dizziness. She states that on the morning on 3/16, she felt the dizziness to be at its worst and presented to her cardiologist Dr. Diaz Goddard's office. She states that she also felt that she was going to faint. Per note at bedside and over the phone with Dr. Goddard, orthostatic vitals were taken and BP was 150/90 both supine and standing. Dr. Goddard adds that multiple runs of SVT up to 170bpm were noted on the PPM that lasted for longer than 58 seconds.  Patient was then recommended to go to the ED for further evaluation. She denies CP, SOB, palpitations, subjective f/c, n/v.    In the ED, T: 98.4, HR 70, /90.   89 yo F with a pmhx of DM, HTN, hyperlipidemia, asthma, afib on eliquis, ILR tachy-inocencia syndrome s/p PPM, hypothyroidism presenting with complaints of weakness and dizziness for the past week. Patient was sent in from her OP cardiologist office after device interrogation revealed multiple runs of SVT up to 170bpm were noted on the PPM that lasted for longer than 58 seconds.     Patient states that she was in [place] last week visiting her daughter and felt dizzy and lightheaded. She states that this occurs regardless of whether she is sitting or standing. She states that she returned from [place] on Saturday and continued to feel weak and like she is "losing balance". She adds that it feels like the room is spinning and cannot recall any provoking factors to the dizziness. She states that on the morning on 3/16, she felt the dizziness to be at its worst and presented to her cardiologist Dr. Diaz Goddard's office. She states that she also felt that she was going to faint. Per note at bedside and over the phone with Dr. Goddard, orthostatic vitals were taken and BP was 150/90 both supine and standing. Dr. Goddard adds that multiple runs of SVT up to 170bpm were noted on the PPM that lasted for longer than 58 seconds. Patient was then recommended to go to the ED for further evaluation and potential Watchman device eval. She denies CP, SOB, palpitations, subjective f/c, n/v.    In the ED, T: 98.4, HR 70, /90.   88 yo F with a pmhx of DM, HTN, hyperlipidemia, asthma, afib on eliquis, ILR tachy-inocencia syndrome s/p PPM, hypothyroidism presenting with complaints of weakness and dizziness for the past week. Patient presented from OP cardiologist office after device interrogation revealed multiple runs of SVT up to 170bpm were noted on the PPM that lasted for longer than 58 seconds.     Patient states that she was in [place] last week visiting her daughter and felt dizzy and lightheaded. She states that this occurs regardless of whether she is sitting or standing. She states that she returned from [place] on Saturday and continued to feel weak and like she is "losing balance". She adds that it feels like the room is spinning and cannot recall any provoking factors to the dizziness. She states that on the morning on 3/16, she felt the dizziness to be at its worst and presented to her cardiologist Dr. Diaz Goddard's office. She states that she also felt that she was going to faint. Per note at bedside and over the phone with Dr. Goddard, orthostatic vitals were taken and BP was 150/90 both supine and standing. Dr. Goddard adds that multiple runs of SVT up to 170bpm were noted on the PPM that lasted for longer than 58 seconds. Patient was then recommended to go to the ED for further evaluation and potential Watchman device eval. She denies CP, SOB, palpitations, subjective f/c, n/v.    In the ED, T: 98.4, HR 70, /90.   88 yo F with a pmhx of DM, HTN, hyperlipidemia, asthma, afib on eliquis, ILR tachy-inocencia syndrome s/p PPM, hypothyroidism presenting with complaints of weakness and dizziness for the past week. Patient presented from OP cardiologist office after device interrogation revealed multiple runs of SVT up to 170bpm that lasted for longer than 58 seconds.     Patient states that she was in [place] last week visiting her daughter and felt dizzy and lightheaded. She states that this occurs regardless of whether she is sitting or standing. She states that she returned from [place] on Saturday and continued to feel weak and like she is "losing balance". She adds that it feels like the room is spinning and cannot recall any provoking factors to the dizziness. She states that on the morning on 3/16, she felt the dizziness to be at its worst and presented to her cardiologist Dr. Diaz Goddard's office. She states that she also felt that she was going to faint. Per note at bedside and over the phone with Dr. Goddard, orthostatic vitals were taken and BP was 150/90 both supine and standing. Dr. Goddard adds that multiple runs of SVT up to 170bpm were noted on the PPM that lasted for longer than 58 seconds. Patient was then recommended to go to the ED for further evaluation and potential Watchman device eval. She denies CP, SOB, palpitations, subjective f/c, n/v.    In the ED, T: 98.4, HR 70, /90.

## 2021-03-16 NOTE — H&P ADULT - NSICDXPASTMEDICALHX_GEN_ALL_CORE_FT
PAST MEDICAL HISTORY:  Asthma     Female Stress Incontinence     Hyperlipidemia     Hypertension     Hypothyroid     Lumbar Spinal Stenosis     Lumbosacral Neuritis     Osteoporosis     Osteoporosis     Pneumonia 11/10

## 2021-03-17 DIAGNOSIS — I48.0 PAROXYSMAL ATRIAL FIBRILLATION: ICD-10-CM

## 2021-03-17 DIAGNOSIS — E78.5 HYPERLIPIDEMIA, UNSPECIFIED: ICD-10-CM

## 2021-03-17 DIAGNOSIS — Z29.9 ENCOUNTER FOR PROPHYLACTIC MEASURES, UNSPECIFIED: ICD-10-CM

## 2021-03-17 DIAGNOSIS — R42 DIZZINESS AND GIDDINESS: ICD-10-CM

## 2021-03-17 DIAGNOSIS — E11.9 TYPE 2 DIABETES MELLITUS WITHOUT COMPLICATIONS: ICD-10-CM

## 2021-03-17 DIAGNOSIS — E04.1 NONTOXIC SINGLE THYROID NODULE: ICD-10-CM

## 2021-03-17 DIAGNOSIS — Z02.9 ENCOUNTER FOR ADMINISTRATIVE EXAMINATIONS, UNSPECIFIED: ICD-10-CM

## 2021-03-17 DIAGNOSIS — E03.9 HYPOTHYROIDISM, UNSPECIFIED: ICD-10-CM

## 2021-03-17 DIAGNOSIS — I49.5 SICK SINUS SYNDROME: ICD-10-CM

## 2021-03-17 DIAGNOSIS — I47.1 SUPRAVENTRICULAR TACHYCARDIA: ICD-10-CM

## 2021-03-17 DIAGNOSIS — I10 ESSENTIAL (PRIMARY) HYPERTENSION: ICD-10-CM

## 2021-03-17 LAB
A1C WITH ESTIMATED AVERAGE GLUCOSE RESULT: 6.5 % — HIGH (ref 4–5.6)
ANION GAP SERPL CALC-SCNC: 13 MMOL/L — SIGNIFICANT CHANGE UP (ref 5–17)
BUN SERPL-MCNC: 18 MG/DL — SIGNIFICANT CHANGE UP (ref 7–23)
CALCIUM SERPL-MCNC: 9.7 MG/DL — SIGNIFICANT CHANGE UP (ref 8.4–10.5)
CHLORIDE SERPL-SCNC: 104 MMOL/L — SIGNIFICANT CHANGE UP (ref 96–108)
CO2 SERPL-SCNC: 21 MMOL/L — LOW (ref 22–31)
CREAT SERPL-MCNC: 1.01 MG/DL — SIGNIFICANT CHANGE UP (ref 0.5–1.3)
CULTURE RESULTS: SIGNIFICANT CHANGE UP
ESTIMATED AVERAGE GLUCOSE: 140 MG/DL — HIGH (ref 68–114)
GLUCOSE BLDC GLUCOMTR-MCNC: 113 MG/DL — HIGH (ref 70–99)
GLUCOSE BLDC GLUCOMTR-MCNC: 82 MG/DL — SIGNIFICANT CHANGE UP (ref 70–99)
GLUCOSE BLDC GLUCOMTR-MCNC: 93 MG/DL — SIGNIFICANT CHANGE UP (ref 70–99)
GLUCOSE BLDC GLUCOMTR-MCNC: 97 MG/DL — SIGNIFICANT CHANGE UP (ref 70–99)
GLUCOSE SERPL-MCNC: 89 MG/DL — SIGNIFICANT CHANGE UP (ref 70–99)
HCT VFR BLD CALC: 41.2 % — SIGNIFICANT CHANGE UP (ref 34.5–45)
HGB BLD-MCNC: 12.9 G/DL — SIGNIFICANT CHANGE UP (ref 11.5–15.5)
MCHC RBC-ENTMCNC: 25.4 PG — LOW (ref 27–34)
MCHC RBC-ENTMCNC: 31.3 GM/DL — LOW (ref 32–36)
MCV RBC AUTO: 81.1 FL — SIGNIFICANT CHANGE UP (ref 80–100)
NRBC # BLD: 0 /100 WBCS — SIGNIFICANT CHANGE UP (ref 0–0)
PLATELET # BLD AUTO: 151 K/UL — SIGNIFICANT CHANGE UP (ref 150–400)
POTASSIUM SERPL-MCNC: 4.1 MMOL/L — SIGNIFICANT CHANGE UP (ref 3.5–5.3)
POTASSIUM SERPL-SCNC: 4.1 MMOL/L — SIGNIFICANT CHANGE UP (ref 3.5–5.3)
RBC # BLD: 5.08 M/UL — SIGNIFICANT CHANGE UP (ref 3.8–5.2)
RBC # FLD: 14.6 % — HIGH (ref 10.3–14.5)
SARS-COV-2 RNA SPEC QL NAA+PROBE: SIGNIFICANT CHANGE UP
SODIUM SERPL-SCNC: 138 MMOL/L — SIGNIFICANT CHANGE UP (ref 135–145)
SPECIMEN SOURCE: SIGNIFICANT CHANGE UP
TROPONIN T, HIGH SENSITIVITY RESULT: 22 NG/L — SIGNIFICANT CHANGE UP (ref 0–51)
TSH SERPL-MCNC: 14.8 UIU/ML — HIGH (ref 0.27–4.2)
WBC # BLD: 6.1 K/UL — SIGNIFICANT CHANGE UP (ref 3.8–10.5)
WBC # FLD AUTO: 6.1 K/UL — SIGNIFICANT CHANGE UP (ref 3.8–10.5)

## 2021-03-17 PROCEDURE — 93280 PM DEVICE PROGR EVAL DUAL: CPT | Mod: 26

## 2021-03-17 PROCEDURE — 99233 SBSQ HOSP IP/OBS HIGH 50: CPT

## 2021-03-17 PROCEDURE — 93306 TTE W/DOPPLER COMPLETE: CPT | Mod: 26

## 2021-03-17 RX ORDER — RIVAROXABAN 15 MG-20MG
15 KIT ORAL
Refills: 0 | Status: DISCONTINUED | OUTPATIENT
Start: 2021-03-17 | End: 2021-03-19

## 2021-03-17 RX ORDER — LEVOTHYROXINE SODIUM 125 MCG
88 TABLET ORAL DAILY
Refills: 0 | Status: DISCONTINUED | OUTPATIENT
Start: 2021-03-17 | End: 2021-03-19

## 2021-03-17 RX ORDER — SENNA PLUS 8.6 MG/1
2 TABLET ORAL AT BEDTIME
Refills: 0 | Status: COMPLETED | OUTPATIENT
Start: 2021-03-17 | End: 2021-03-18

## 2021-03-17 RX ORDER — METOPROLOL TARTRATE 50 MG
25 TABLET ORAL DAILY
Refills: 0 | Status: DISCONTINUED | OUTPATIENT
Start: 2021-03-17 | End: 2021-03-19

## 2021-03-17 RX ORDER — ACETAMINOPHEN 500 MG
650 TABLET ORAL ONCE
Refills: 0 | Status: COMPLETED | OUTPATIENT
Start: 2021-03-17 | End: 2021-03-17

## 2021-03-17 RX ORDER — LISINOPRIL 2.5 MG/1
5 TABLET ORAL DAILY
Refills: 0 | Status: DISCONTINUED | OUTPATIENT
Start: 2021-03-17 | End: 2021-03-19

## 2021-03-17 RX ORDER — ATORVASTATIN CALCIUM 80 MG/1
40 TABLET, FILM COATED ORAL AT BEDTIME
Refills: 0 | Status: DISCONTINUED | OUTPATIENT
Start: 2021-03-17 | End: 2021-03-19

## 2021-03-17 RX ORDER — CHOLECALCIFEROL (VITAMIN D3) 125 MCG
2000 CAPSULE ORAL DAILY
Refills: 0 | Status: DISCONTINUED | OUTPATIENT
Start: 2021-03-17 | End: 2021-03-19

## 2021-03-17 RX ADMIN — LISINOPRIL 5 MILLIGRAM(S): 2.5 TABLET ORAL at 06:15

## 2021-03-17 RX ADMIN — Medication 25 MILLIGRAM(S): at 06:15

## 2021-03-17 RX ADMIN — Medication 650 MILLIGRAM(S): at 21:27

## 2021-03-17 RX ADMIN — Medication 2000 UNIT(S): at 11:44

## 2021-03-17 RX ADMIN — RIVAROXABAN 15 MILLIGRAM(S): KIT at 18:39

## 2021-03-17 RX ADMIN — ATORVASTATIN CALCIUM 40 MILLIGRAM(S): 80 TABLET, FILM COATED ORAL at 21:32

## 2021-03-17 RX ADMIN — Medication 0: at 17:07

## 2021-03-17 RX ADMIN — Medication 88 MICROGRAM(S): at 06:15

## 2021-03-17 RX ADMIN — Medication 650 MILLIGRAM(S): at 22:01

## 2021-03-17 NOTE — CONSULT NOTE ADULT - SUBJECTIVE AND OBJECTIVE BOX
EP Attending    HISTORY OF PRESENT ILLNESS: HPI:  89 yo F with a pmhx of DM, HTN, hyperlipidemia, asthma, afib on eliquis, ILR tachy-inocencia syndrome s/p PPM, hypothyroidism presenting with complaints of weakness and dizziness for the past week. Patient presented from OP cardiologist office after device interrogation revealed multiple runs of SVT up to 170bpm that lasted for longer than 58 seconds.     Patient states that she was in [place] last week visiting her daughter and felt dizzy and lightheaded. She states that this occurs regardless of whether she is sitting or standing. She states that she returned from [place] on Saturday and continued to feel weak and like she is "losing balance". She adds that it feels like the room is spinning and cannot recall any provoking factors to the dizziness. She states that on the morning on 3/16, she felt the dizziness to be at its worst and presented to her cardiologist Dr. Diaz Goddard's office. She states that she also felt that she was going to faint. Per note at bedside and over the phone with Dr. Goddard, orthostatic vitals were taken and BP was 150/90 both supine and standing. Dr. Goddard adds that multiple runs of SVT up to 170bpm were noted on the PPM that lasted for longer than 58 seconds. Patient was then recommended to go to the ED for further evaluation and potential Watchman device eval. She denies CP, SOB, palpitations, subjective f/c, n/v.    3/16- Ms Taveras has a dual chamber Medtronic pacemaker by Dr House, who she sees regularly for AF management and device checks.  States she has not been having palpitations, but has been feeling lightheaded and 'a little bit drunk' when she moves around her home.  No angina, orthopnea, fainting or falls.  She is anticaogulated w/ reduced-dose Rivaroxaban, and has no bleeding issues. A 10 pt ROS is otherwise negative.      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 1000 milliLiter(s) (50 mL/Hr) IV Continuous <Continuous>  dextrose 5%. 1000 milliLiter(s) (100 mL/Hr) IV Continuous <Continuous>  dextrose 50% Injectable 25 Gram(s) IV Push once  dextrose 50% Injectable 12.5 Gram(s) IV Push once  dextrose 50% Injectable 25 Gram(s) IV Push once  glucagon  Injectable 1 milliGRAM(s) IntraMuscular once  insulin lispro (ADMELOG) corrective regimen sliding scale   SubCutaneous three times a day before meals  insulin lispro (ADMELOG) corrective regimen sliding scale   SubCutaneous at bedtime  levothyroxine 88 MICROGram(s) Oral daily  lisinopril 5 milliGRAM(s) Oral daily  metoprolol succinate ER 25 milliGRAM(s) Oral daily  rivaroxaban 15 milliGRAM(s) Oral with dinner      Allergies    No Known Allergies    Intolerances        FAMILY HISTORY:    Non-contributary for premature coronary disease or sudden cardiac death    SOCIAL HISTORY:    [x ] Non-smoker  [ ] Smoker  [ ] Alcohol    PHYSICAL EXAM:  T(C): 36.6 (03-17-21 @ 05:42), Max: 36.9 (03-16-21 @ 18:15)  HR: 88 (03-17-21 @ 12:11) (68 - 88)  BP: 133/85 (03-17-21 @ 12:11) (133/85 - 165/94)  RR: 18 (03-17-21 @ 12:11) (16 - 20)  SpO2: 96% (03-17-21 @ 12:11) (96% - 100%)  Wt(kg): --    General: Well nourished older woman in no acute distress, alert and oriented x 3  Head: normocephalic, no trauma  Neck: no JVD, no bruit, supple, not enlarged  CV: S1S2, no S3, regular rate, rhythm is SINUS, pacemaker in left upper chest, no murmurs.    Lungs: clear BL, no rales or wheezes  Abdomen: bowel sounds +, soft, nontender, nondistended  Extremities: no clubbing, cyanosis or edema  Neuro: Moves all 4 extremities, sensation intact x 4 extremities  Skin: warm and moist, normal turgor  Psych: Mood and affect are appropriate for circumstances  MSK: normal range of motion and strength x4 extremities.      TELEMETRY: NSR    ECG: NSR  	  LABS:	 	                          12.9   6.10  )-----------( 151      ( 17 Mar 2021 06:38 )             41.2     03-17    138  |  104  |  18  ----------------------------<  89  4.1   |  21<L>  |  1.01    Ca    9.7      17 Mar 2021 06:38  Phos  3.3     03-16  Mg     2.0     03-16    TPro  7.0  /  Alb  4.0  /  TBili  0.7  /  DBili  x   /  AST  27  /  ALT  13  /  AlkPhos  27<L>  03-16    proBNP: Serum Pro-Brain Natriuretic Peptide: 93 pg/mL (03-16 @ 16:09)    ASSESSMENT/PLAN: 	89y Female with dizzyness and symptomatic lightheadedness during activity.    Seen by neurology, considering vestibular dysfunction.  Have Medtronic pacemaker interrogated by in-house EP team, to see if symptoms are correlating to worsening paroxysmal AF burden.  If so, I can discuss management w/ Dr House prior to her return to his office.  Remainder of Cardiac workup per Dr Hills/Dr Montoya.    Sebastian Krause M.D.  Cardiac Electrophysiology    office [phone]  pager [phone]
Requesting Physician : Dr. Goddard     Reason for Consultation: Palpitations     HISTORY OF PRESENT ILLNESS: 89 year old female with history of PAF on ac with NOAC, history of ILR, s/p MDT PPM for tachy-inocencia syndrome, HTN, DM, HLD, asthma who is being seen for palpitations.  The patient presented to the ED with 1-2 week history of dizziness, near syncope, and palpitations.  The patient saw her primary cardiologist where PPM interrogation demonstrated SVT up to 170bpm that lasted for longer than 58 seconds.   The patient denies chest pain or anginal symptoms.  The patient was admitted and underwent CTH demonstrating no CVA or ICH.         PAST MEDICAL & SURGICAL HISTORY:  Female Stress Incontinence    Lumbosacral Neuritis    Lumbar Spinal Stenosis    Osteoporosis    Hypothyroid    Hypertension    Osteoporosis    Hyperlipidemia    Pneumonia  11/10    Asthma    Status Post Total Knee Replacement  bilateral 2006, 2007            MEDICATIONS:  MEDICATIONS  (STANDING):  atorvastatin 40 milliGRAM(s) Oral at bedtime  cholecalciferol 2000 Unit(s) Oral daily  dextrose 40% Gel 15 Gram(s) Oral once  dextrose 5%. 1000 milliLiter(s) (50 mL/Hr) IV Continuous <Continuous>  dextrose 5%. 1000 milliLiter(s) (100 mL/Hr) IV Continuous <Continuous>  dextrose 50% Injectable 25 Gram(s) IV Push once  dextrose 50% Injectable 12.5 Gram(s) IV Push once  dextrose 50% Injectable 25 Gram(s) IV Push once  glucagon  Injectable 1 milliGRAM(s) IntraMuscular once  insulin lispro (ADMELOG) corrective regimen sliding scale   SubCutaneous three times a day before meals  insulin lispro (ADMELOG) corrective regimen sliding scale   SubCutaneous at bedtime  levothyroxine 88 MICROGram(s) Oral daily  lisinopril 5 milliGRAM(s) Oral daily  metoprolol succinate ER 25 milliGRAM(s) Oral daily  rivaroxaban 15 milliGRAM(s) Oral with dinner      Allergies    No Known Allergies    Intolerances        FAMILY HISTORY:    Non-contributary for premature coronary disease or sudden cardiac death    SOCIAL HISTORY:    [x ] Non-smoker  [ ] Smoker  [ ] Alcohol      REVIEW OF SYSTEMS:  [ ]chest pain  [  ]shortness of breath  [ x ]palpitations  [  ]syncope  [x ]near syncope [ ]upper extremity weakness   [ ] lower extremity weakness  [  ]diplopia  [  ]altered mental status   [  ]fevers  [ ]chills [ ]nausea  [ ]vomitting  [  ]dysphagia    [ ]abdominal pain  [ ]melena  [ ]BRBPR    [  ]epistaxis  [  ]rash    [ ]lower extremity edema        [x ] All others negative	  [ ] Unable to obtain    PHYSICAL EXAM:  T(C): 36.6 (03-17-21 @ 05:42), Max: 36.9 (03-16-21 @ 18:15)  HR: 88 (03-17-21 @ 12:11) (68 - 88)  BP: 133/85 (03-17-21 @ 12:11) (133/85 - 165/94)  RR: 18 (03-17-21 @ 12:11) (16 - 20)  SpO2: 96% (03-17-21 @ 12:11) (96% - 100%)  Wt(kg): --  I&O's Summary        HEENT:   Normal oral mucosa, PERRL, EOMI	  Lymphatic: No lymphadenopathy , no edema  Cardiovascular: Normal S1 S2, No JVD, No murmurs , Peripheral pulses palpable 2+ bilaterally  Respiratory: Lungs clear to auscultation, normal effort 	  Gastrointestinal:  Soft, Non-tender, + BS	  Skin: No rashes, No ecchymoses, No cyanosis, warm to touch  Musculoskeletal: Normal range of motion, normal strength  Psychiatry:  Mood & affect appropriate      TELEMETRY: SR	    ECG: SR 	  RADIOLOGY:  OTHER:     DIAGNOSTIC TESTING:  [ ] Echocardiogram:   [ ]  Catheterization:  [ ] Stress Test:    	  	  LABS:	 	    CARDIAC MARKERS:                              12.9   6.10  )-----------( 151      ( 17 Mar 2021 06:38 )             41.2     03-17    138  |  104  |  18  ----------------------------<  89  4.1   |  21<L>  |  1.01    Ca    9.7      17 Mar 2021 06:38  Phos  3.3     03-16  Mg     2.0     03-16    TPro  7.0  /  Alb  4.0  /  TBili  0.7  /  DBili  x   /  AST  27  /  ALT  13  /  AlkPhos  27<L>  03-16    proBNP: Serum Pro-Brain Natriuretic Peptide: 93 pg/mL (03-16 @ 16:09)    Lipid Profile:   HgA1c:   TSH:     ASSESSMENT/PLAN: 89 year old female with history of PAF on ac with NOAC, history of ILR, s/p MDT PPM for tachy-inocencia syndrome, HTN, DM, HLD, asthma who is being seen for palpitations.    -pt. currently symptom free  -pt. with indeterminate troponin with no anginal symptoms - do not suspect acs   -CTH negative for CVA  -Check PPM interrogation  -EP eval with Dr. Krause for symptomatic SVT and possible ablation  -check TTE  -further workup pending above    Tavo Montoya MD     
VASCULAR NEUROLOGY ATTENDING NOTE    Patient seen and examined history and imaging reviewed. Agree with resident/fellow note as applicable.    Patient states that she was in [place] last week visiting her daughter and felt dizzy and lightheaded. She states that this occurs regardless of whether she is sitting or standing. She states that she returned from [place] on Saturday and continued to feel weak and like she is "losing balance". She adds that it feels like the room is spinning and cannot recall any provoking factors to the dizziness. She states that on the morning on 3/16, she felt the dizziness to be at its worst and presented to her cardiologist Dr. Diaz Goddard's office.       Overnight Events: None    VITALS:  Vital Signs Last 24 Hrs  T(C): 36.6 (17 Mar 2021 05:42), Max: 36.9 (16 Mar 2021 18:15)  T(F): 97.9 (17 Mar 2021 05:42), Max: 98.4 (16 Mar 2021 18:15)  HR: 76 (17 Mar 2021 05:42) (68 - 76)  BP: 152/83 (17 Mar 2021 05:42) (142/83 - 165/94)  BP(mean): --  RR: 18 (17 Mar 2021 05:42) (16 - 20)  SpO2: 96% (17 Mar 2021 05:42) (96% - 100%)    Labs:   03-17    138  |  104  |  18  ----------------------------<  89  4.1   |  21<L>  |  1.01    Ca    9.7      17 Mar 2021 06:38  Phos  3.3     03-16  Mg     2.0     03-16    TPro  7.0  /  Alb  4.0  /  TBili  0.7  /  DBili  x   /  AST  27  /  ALT  13  /  AlkPhos  27<L>  03-16                          12.9   6.10  )-----------( 151      ( 17 Mar 2021 06:38 )             41.2       MEDS:  atorvastatin 40 milliGRAM(s) Oral at bedtime  cholecalciferol 2000 Unit(s) Oral daily  dextrose 40% Gel 15 Gram(s) Oral once  dextrose 5%. 1000 milliLiter(s) IV Continuous <Continuous>  dextrose 5%. 1000 milliLiter(s) IV Continuous <Continuous>  dextrose 50% Injectable 25 Gram(s) IV Push once  dextrose 50% Injectable 12.5 Gram(s) IV Push once  dextrose 50% Injectable 25 Gram(s) IV Push once  glucagon  Injectable 1 milliGRAM(s) IntraMuscular once  insulin lispro (ADMELOG) corrective regimen sliding scale   SubCutaneous three times a day before meals  insulin lispro (ADMELOG) corrective regimen sliding scale   SubCutaneous at bedtime  levothyroxine 88 MICROGram(s) Oral daily  lisinopril 5 milliGRAM(s) Oral daily  metoprolol succinate ER 25 milliGRAM(s) Oral daily  rivaroxaban 15 milliGRAM(s) Oral with dinner      Exam:   MS: AAOx3, no aphasia  CNs:  PERRL, EOMI, VFF, face symmetric, tongue midline, no dysarthria  Motor:  no drift, 5/5 strength throughout, DELMA intact  Sensory:  intact sensation throughout, no extinction  Coord:  no dysmetria,   Gait: cautions steady one person assist

## 2021-03-17 NOTE — CONSULT NOTE ADULT - ATTENDING COMMENTS
VASCULAR NEUROLOGY ATTENDING  The patient is seen and examined the history and imaging are reviewed. I agree with the resident note unless otherwise noted. Patient with likely multifactorial gait disorder including cardiogenic, spinal stenosis, osteoarthritis, peripheral vertigo. At this point benefit of AC outways potential fall risk. Advise PT/OT. Will arrange outpatient vestibular therapy.

## 2021-03-17 NOTE — PROCEDURE NOTE - ADDITIONAL PROCEDURE DETAILS
Indication for Interrogation: Dizziness, Pre-Syncope  Presenting Rhythm: NSR 70s  Measured lead impedence (Atrial impedence measured 1159 ohms) and sensing WNL, Atrial threshold unable to obtain due to device programmed as capture threshold off, RV threshold WNL  Not PPM dependent.   A-Sensed/V-Sensed 97.2%, A-Paced <0.1% / V-Paced 0.1%  No recorded events since last interrogation on 12/2020  Discussed data with primary team.     DIXON Raphael PA-C  [id number]

## 2021-03-18 ENCOUNTER — TRANSCRIPTION ENCOUNTER (OUTPATIENT)
Age: 86
End: 2021-03-18

## 2021-03-18 LAB
A1C WITH ESTIMATED AVERAGE GLUCOSE RESULT: 6.5 % — HIGH (ref 4–5.6)
ALBUMIN SERPL ELPH-MCNC: 3.6 G/DL — SIGNIFICANT CHANGE UP (ref 3.3–5)
ALP SERPL-CCNC: 28 U/L — LOW (ref 40–120)
ALT FLD-CCNC: 12 U/L — SIGNIFICANT CHANGE UP (ref 10–45)
ANION GAP SERPL CALC-SCNC: 12 MMOL/L — SIGNIFICANT CHANGE UP (ref 5–17)
AST SERPL-CCNC: 16 U/L — SIGNIFICANT CHANGE UP (ref 10–40)
BASOPHILS # BLD AUTO: 0.04 K/UL — SIGNIFICANT CHANGE UP (ref 0–0.2)
BASOPHILS NFR BLD AUTO: 0.7 % — SIGNIFICANT CHANGE UP (ref 0–2)
BILIRUB SERPL-MCNC: 1 MG/DL — SIGNIFICANT CHANGE UP (ref 0.2–1.2)
BUN SERPL-MCNC: 21 MG/DL — SIGNIFICANT CHANGE UP (ref 7–23)
CALCIUM SERPL-MCNC: 9.6 MG/DL — SIGNIFICANT CHANGE UP (ref 8.4–10.5)
CHLORIDE SERPL-SCNC: 105 MMOL/L — SIGNIFICANT CHANGE UP (ref 96–108)
CO2 SERPL-SCNC: 22 MMOL/L — SIGNIFICANT CHANGE UP (ref 22–31)
CREAT SERPL-MCNC: 1.11 MG/DL — SIGNIFICANT CHANGE UP (ref 0.5–1.3)
EOSINOPHIL # BLD AUTO: 0.16 K/UL — SIGNIFICANT CHANGE UP (ref 0–0.5)
EOSINOPHIL NFR BLD AUTO: 2.7 % — SIGNIFICANT CHANGE UP (ref 0–6)
ESTIMATED AVERAGE GLUCOSE: 140 MG/DL — HIGH (ref 68–114)
GLUCOSE BLDC GLUCOMTR-MCNC: 101 MG/DL — HIGH (ref 70–99)
GLUCOSE BLDC GLUCOMTR-MCNC: 102 MG/DL — HIGH (ref 70–99)
GLUCOSE BLDC GLUCOMTR-MCNC: 127 MG/DL — HIGH (ref 70–99)
GLUCOSE BLDC GLUCOMTR-MCNC: 92 MG/DL — SIGNIFICANT CHANGE UP (ref 70–99)
GLUCOSE SERPL-MCNC: 97 MG/DL — SIGNIFICANT CHANGE UP (ref 70–99)
HCT VFR BLD CALC: 41.7 % — SIGNIFICANT CHANGE UP (ref 34.5–45)
HGB BLD-MCNC: 13.2 G/DL — SIGNIFICANT CHANGE UP (ref 11.5–15.5)
IMM GRANULOCYTES NFR BLD AUTO: 0.3 % — SIGNIFICANT CHANGE UP (ref 0–1.5)
LYMPHOCYTES # BLD AUTO: 1.71 K/UL — SIGNIFICANT CHANGE UP (ref 1–3.3)
LYMPHOCYTES # BLD AUTO: 29.2 % — SIGNIFICANT CHANGE UP (ref 13–44)
MAGNESIUM SERPL-MCNC: 2 MG/DL — SIGNIFICANT CHANGE UP (ref 1.6–2.6)
MCHC RBC-ENTMCNC: 25.5 PG — LOW (ref 27–34)
MCHC RBC-ENTMCNC: 31.7 GM/DL — LOW (ref 32–36)
MCV RBC AUTO: 80.7 FL — SIGNIFICANT CHANGE UP (ref 80–100)
MONOCYTES # BLD AUTO: 0.78 K/UL — SIGNIFICANT CHANGE UP (ref 0–0.9)
MONOCYTES NFR BLD AUTO: 13.3 % — SIGNIFICANT CHANGE UP (ref 2–14)
NEUTROPHILS # BLD AUTO: 3.14 K/UL — SIGNIFICANT CHANGE UP (ref 1.8–7.4)
NEUTROPHILS NFR BLD AUTO: 53.8 % — SIGNIFICANT CHANGE UP (ref 43–77)
NRBC # BLD: 0 /100 WBCS — SIGNIFICANT CHANGE UP (ref 0–0)
PLATELET # BLD AUTO: 159 K/UL — SIGNIFICANT CHANGE UP (ref 150–400)
POTASSIUM SERPL-MCNC: 3.9 MMOL/L — SIGNIFICANT CHANGE UP (ref 3.5–5.3)
POTASSIUM SERPL-SCNC: 3.9 MMOL/L — SIGNIFICANT CHANGE UP (ref 3.5–5.3)
PROT SERPL-MCNC: 6.5 G/DL — SIGNIFICANT CHANGE UP (ref 6–8.3)
RBC # BLD: 5.17 M/UL — SIGNIFICANT CHANGE UP (ref 3.8–5.2)
RBC # FLD: 14.6 % — HIGH (ref 10.3–14.5)
SODIUM SERPL-SCNC: 139 MMOL/L — SIGNIFICANT CHANGE UP (ref 135–145)
T3 SERPL-MCNC: 59 NG/DL — LOW (ref 80–200)
T4 AB SER-ACNC: 5.1 UG/DL — SIGNIFICANT CHANGE UP (ref 4.6–12)
TSH SERPL-MCNC: 12.9 UIU/ML — HIGH (ref 0.27–4.2)
WBC # BLD: 5.85 K/UL — SIGNIFICANT CHANGE UP (ref 3.8–10.5)
WBC # FLD AUTO: 5.85 K/UL — SIGNIFICANT CHANGE UP (ref 3.8–10.5)

## 2021-03-18 PROCEDURE — 99232 SBSQ HOSP IP/OBS MODERATE 35: CPT

## 2021-03-18 RX ORDER — ROSUVASTATIN CALCIUM 5 MG/1
1 TABLET ORAL
Qty: 0 | Refills: 0 | DISCHARGE

## 2021-03-18 RX ORDER — ACETAMINOPHEN 500 MG
650 TABLET ORAL EVERY 6 HOURS
Refills: 0 | Status: DISCONTINUED | OUTPATIENT
Start: 2021-03-18 | End: 2021-03-19

## 2021-03-18 RX ORDER — ATORVASTATIN CALCIUM 80 MG/1
1 TABLET, FILM COATED ORAL
Qty: 0 | Refills: 0 | DISCHARGE
Start: 2021-03-18

## 2021-03-18 RX ADMIN — Medication 650 MILLIGRAM(S): at 17:19

## 2021-03-18 RX ADMIN — Medication 2000 UNIT(S): at 12:32

## 2021-03-18 RX ADMIN — Medication 88 MICROGRAM(S): at 06:47

## 2021-03-18 RX ADMIN — Medication 650 MILLIGRAM(S): at 22:56

## 2021-03-18 RX ADMIN — Medication 25 MILLIGRAM(S): at 05:48

## 2021-03-18 RX ADMIN — RIVAROXABAN 15 MILLIGRAM(S): KIT at 16:49

## 2021-03-18 RX ADMIN — Medication 650 MILLIGRAM(S): at 16:49

## 2021-03-18 RX ADMIN — SENNA PLUS 2 TABLET(S): 8.6 TABLET ORAL at 05:48

## 2021-03-18 RX ADMIN — LISINOPRIL 5 MILLIGRAM(S): 2.5 TABLET ORAL at 05:49

## 2021-03-18 RX ADMIN — ATORVASTATIN CALCIUM 40 MILLIGRAM(S): 80 TABLET, FILM COATED ORAL at 21:32

## 2021-03-18 NOTE — PHYSICAL THERAPY INITIAL EVALUATION ADULT - ADDITIONAL COMMENTS
Pt reports living alone in an apartment. There are no steps to enter, elevator once inside. PTA, pt was independent with all mobility without the use of an AD. Pt still drives, attends outpatient physical therapy, overall very active.    CT brain: No acute intracranial hemorrhage, mass effect, midline shift or acute cerebral cortical infarct.

## 2021-03-18 NOTE — DISCHARGE NOTE PROVIDER - NSDCCPCAREPLAN_GEN_ALL_CORE_FT
PRINCIPAL DISCHARGE DIAGNOSIS  Diagnosis: Dizziness  Assessment and Plan of Treatment: Echo normal. PPM interrogated with normal function, no arrhythmias, minimally dependent. Echo with normal LV function, EF 70%.Nor further interventions from cardiology standpoint now. Follow up with Dr. Diaz Goddard.   Follow up with neurology for vestibular therapy.      SECONDARY DISCHARGE DIAGNOSES  Diagnosis: Hypothyroid  Assessment and Plan of Treatment: Continue synthroid. Incidential findings of thyroid nodule. Follow up for non urgent US which can be ordered by PMD.    Diagnosis: Diabetes mellitus  Assessment and Plan of Treatment: Continue home meds. Follow up with PMD.    Diagnosis: HTN (hypertension)  Assessment and Plan of Treatment: continue home meds toprol, lisinopril    Diagnosis: Afib  Assessment and Plan of Treatment: continue Xarelto.    Diagnosis: Tachy-inocencia syndrome  Assessment and Plan of Treatment: Tachy-inocencia syndrome with PPM, PPM interrogated with normal function, no arrhythmias, minimally dependent. Echo with normal LV function, EF 70%Nor further interventions from cardiology standpoint now. Follow up with Dr. Diaz Goddard.     PRINCIPAL DISCHARGE DIAGNOSIS  Diagnosis: Dizziness  Assessment and Plan of Treatment: Echo normal. PPM interrogated with normal function, no arrhythmias, minimally dependent. Echo with normal LV function, EF 70%.Nor further interventions from cardiology standpoint now. Follow up with Dr. Diaz Goddard.   Follow up with neurology for vestibular therapy.      SECONDARY DISCHARGE DIAGNOSES  Diagnosis: Thyroid nodule  Assessment and Plan of Treatment: ncidental 1.0 x 0.8 cm right thyroid lobe nodule.   -Recommend nonemergent thyroid ultrasound as clinically warranted  -pt informed and will with pcp for outpatient US.       Diagnosis: Hypothyroid  Assessment and Plan of Treatment: Continue synthroid. Incidential findings of thyroid nodule. Follow up for non urgent US which can be ordered by PMD.    Diagnosis: Diabetes mellitus  Assessment and Plan of Treatment: Continue home meds. Follow up with PMD.    Diagnosis: HTN (hypertension)  Assessment and Plan of Treatment: continue home meds toprol, lisinopril    Diagnosis: Afib  Assessment and Plan of Treatment: continue Xarelto.    Diagnosis: Tachy-inocencia syndrome  Assessment and Plan of Treatment: Tachy-inocencia syndrome with PPM, PPM interrogated with normal function, no arrhythmias, minimally dependent. Echo with normal LV function, EF 70%Nor further interventions from cardiology standpoint now. Follow up with Dr. Diaz Goddard.

## 2021-03-18 NOTE — DISCHARGE NOTE PROVIDER - HOSPITAL COURSE
89 yo F with a pmhx of DM, HTN, hyperlipidemia, asthma, afib on eliquis, ILR tachy-inocencia syndrome s/p PPM, hypothyroidism presenting with complaints of weakness and dizziness for the past week. Patient presented from OP cardiologist office after device interrogation revealed multiple runs of SVT up to 170bpm that lasted for longer than 58 seconds.     Patient states that she was in [place] last week visiting her daughter and felt dizzy and lightheaded. She states that this occurs regardless of whether she is sitting or standing. She states that she returned from [place] on Saturday and continued to feel weak and like she is "losing balance". She adds that it feels like the room is spinning and cannot recall any provoking factors to the dizziness. She states that on the morning on 3/16, she felt the dizziness to be at its worst and presented to her cardiologist Dr. Diaz Goddard's office. She states that she also felt that she was going to faint. Per note at bedside and over the phone with Dr. Goddard, orthostatic vitals were taken and BP was 150/90 both supine and standing. Dr. Goddard adds that multiple runs of SVT up to 170bpm were noted on the PPM that lasted for longer than 58 seconds. Patient was then recommended to go to the ED for further evaluation and potential Watchman device eval. She denies CP, SOB, palpitations, subjective f/c, n/v.    In the ED, T: 98.4, HR 70, /90.    CT brain: No acute intracranial hemorrhage, mass effect, midline shift or acute cerebral cortical infarct. CT angiography neck: Less than 50% bilaterally ICA stenosis by NASCET criteria. No vascular dissection. 1.0 x 0.8 cm right thyroid lobe nodule. Recommend nonemergent thyroid ultrasound as clinically warranted.CT angiography brain: No major vessel occlusion or proximal stenosis. No evidence of aneurysm or other vascular malformation.  NASCET criteria for internal carotid artery stenosis:  Mild: 0% to 49%  Moderate: 50% to 69%  Severe: 70% to 99%    Per neurology assessment, likely multifactorial gait disorder including cardiogenic, spinal stenosis, osteoarthritis, peripheral vertigo. At this point benefit of AC outways potential fall risk. Recommended outpatient vestibular therapy. Pt recommending home with outpt PT/services.    EP interrogation done 3/17 with normal function and no atrial fibrillation- no cardiac rhythm explanation for her symptoms.   88 yo F with a pmhx of DM, HTN, hyperlipidemia, asthma, afib on eliquis, ILR tachy-inocencia syndrome s/p PPM, hypothyroidism presenting with complaints of weakness and dizziness for the past week. Patient presented from OP cardiologist office after device interrogation revealed multiple runs of SVT up to 170bpm that lasted for longer than 58 seconds.     Patient states that she was in [place] last week visiting her daughter and felt dizzy and lightheaded. She states that this occurs regardless of whether she is sitting or standing. She states that she returned from [place] on Saturday and continued to feel weak and like she is "losing balance". She adds that it feels like the room is spinning and cannot recall any provoking factors to the dizziness. She states that on the morning on 3/16, she felt the dizziness to be at its worst and presented to her cardiologist Dr. Diaz Goddard's office. She states that she also felt that she was going to faint. Per note at bedside and over the phone with Dr. Goddard, orthostatic vitals were taken and BP was 150/90 both supine and standing. Dr. oGddard adds that multiple runs of SVT up to 170bpm were noted on the PPM that lasted for longer than 58 seconds. Patient was then recommended to go to the ED for further evaluation and potential Watchman device eval. She denies CP, SOB, palpitations, subjective f/c, n/v.    In the ED, T: 98.4, HR 70, /90.    CT brain: No acute intracranial hemorrhage, mass effect, midline shift or acute cerebral cortical infarct. CT angiography neck: Less than 50% bilaterally ICA stenosis by NASCET criteria. No vascular dissection. 1.0 x 0.8 cm right thyroid lobe nodule. Recommend nonemergent thyroid ultrasound as clinically warranted.CT angiography brain: No major vessel occlusion or proximal stenosis. No evidence of aneurysm or other vascular malformation.  NASCET criteria for internal carotid artery stenosis:  Mild: 0% to 49%  Moderate: 50% to 69%  Severe: 70% to 99%    Per neurology assessment, likely multifactorial gait disorder including cardiogenic, spinal stenosis, osteoarthritis, peripheral vertigo. At this point benefit of AC outways potential fall risk. Recommended outpatient vestibular therapy. Pt recommending home with outpt PT/services.    EP interrogation done 3/17 with normal function and no atrial fibrillation- no cardiac rhythm explanation for her symptoms.  Echo with EF 70%, nor LV sys function. No further cardiac workup planned for inpt at this time per cards and EP. Pt will follow up with Cardiologist/PCP, Dr Diaz Goddard.    Pt would be able to follow up with with PCP to obtain non urgent thyroid US for incidental thyroid finding. TSH high, T4 normal. Plan to continue synthroid home med.    Pt safe and HDS for discharge. Pt ok with plan for discharge. Discussed with attending, Dr. Elmore. Med rec reviewed. Pt completed Pfizer COVID vaccination x 2 already.    PPM interrogation report 3/17/21:  · Procedure Name  PPM Interrogation Note  · TIME OUT  Patient's first and last name, , procedure, and correct site confirmed prior to the start of procedure.  · Procedure Performed By  Myself  ·   Medtronic  · Model  Jenni XT DR ALDANA [id number]  · Mode  DDD  · Rate   bpm  · Atrial Lead  Yes  · P-wave amplitude (mV)  0.9  · Right Ventricular Lead  Yes  · R-wave amplitude (mV)  10  · Threshold (V)  0.75  · Threshold at (ms)  0.4  · Underlying Rhythm  SR 70s  · Comments  Battery Longevity: 15.2 years  · Additional Procedure Details  Indication for Interrogation: Dizziness, Pre-Syncope  Presenting Rhythm: NSR 70s  Measured lead impedence (Atrial impedence measured 1159 ohms) and sensing WNL, Atrial threshold unable to obtain due to device programmed as capture threshold off, RV threshold WNL  Not PPM dependent.   A-Sensed/V-Sensed 97.2%, A-Paced <0.1% / V-Paced 0.1%  No recorded events since last interrogation on 2020  Discussed data with primary team.     DIXON Raphael PA-C  [id number]

## 2021-03-18 NOTE — DISCHARGE NOTE PROVIDER - NSDCFUADDINST_GEN_ALL_CORE_FT
Bring all discharge paperwork to follow ups.    If worsening symptoms, feel close to syncopal episode, worsening gait stability, please seek immediate medical attention.

## 2021-03-18 NOTE — PHYSICAL THERAPY INITIAL EVALUATION ADULT - GENERAL OBSERVATIONS, REHAB EVAL
Pt is an 89 y/o F with a PMH of DM, HTN, afib on eliquis, ILR tachy-inocencia syndrome s/p PPM who presented with complaints of weakness and dizziness for the past week. Patient presented from OP cardiologist office after device interrogation revealed multiple runs of SVT up to 170bpm that lasted for longer than 58 seconds. Stated that she was in [place] last week visiting her daughter and felt dizzy and lightheaded. She stated that this occurs regardless of whether she is sitting or standing. Returned from [place] on Saturday and continued to feel weak and like she is "losing balance". She added that it feels like the room is spinning and cannot recall any provoking factors to the dizziness.

## 2021-03-18 NOTE — DISCHARGE NOTE PROVIDER - NSDCMRMEDTOKEN_GEN_ALL_CORE_FT
atorvastatin 40 mg oral tablet: 1 tab(s) orally once a day (at bedtime)  biotin 1000 mcg oral tablet: 1 tab(s) orally once a day  clotrimazole 1% vaginal cream with applicator: 1 applicatorful vaginal once a day (at bedtime) until 3/23/21  levothyroxine 88 mcg (0.088 mg) oral tablet: 1 tab(s) orally once a day  lisinopril 5 mg oral tablet: 1 tab(s) orally once a day  Metoprolol Succinate ER 25 mg oral tablet, extended release: 1 tab(s) orally once a day  raloxifene 60 mg oral tablet: 1 tab(s) orally once a day  Vitamin D3 2000 intl units (50 mcg) oral capsule: 1 cap(s) orally once a day  Xarelto 15 mg oral tablet: 1 tab(s) orally once a day   atorvastatin 40 mg oral tablet: 1 tab(s) orally once a day (at bedtime)  biotin 1000 mcg oral tablet: 1 tab(s) orally once a day  clotrimazole 1% vaginal cream with applicator: 1 applicatorful vaginal once a day (at bedtime) until 3/23/21  levothyroxine 88 mcg (0.088 mg) oral tablet: 1 tab(s) orally once a day  lisinopril 5 mg oral tablet: 1 tab(s) orally once a day  Metoprolol Succinate ER 25 mg oral tablet, extended release: 1 tab(s) orally once a day  Physical Therapy:   raloxifene 60 mg oral tablet: 1 tab(s) orally once a day  Vitamin D3 2000 intl units (50 mcg) oral capsule: 1 cap(s) orally once a day  Xarelto 15 mg oral tablet: 1 tab(s) orally once a day

## 2021-03-18 NOTE — DISCHARGE NOTE PROVIDER - CARE PROVIDER_API CALL
Nitin France (DO)  Neurology; Vascular Neurology  [address], [address]  [place] [address]  Phone: [phone]  Fax: [phone]  Follow Up Time:     Diaz Goddard  CARDIOVASCULAR DISEASE  [address] [place] [address], [address]  [place] [address]  Phone: [phone]  Fax: [phone]  Follow Up Time:

## 2021-03-18 NOTE — PHYSICAL THERAPY INITIAL EVALUATION ADULT - STRENGTHENING, PT EVAL
3. GOAL: Pt will increase strength in BLE by least 1/2 grade within 3 weeks to improve functional mobility.

## 2021-03-18 NOTE — DISCHARGE NOTE PROVIDER - NSDCFUADDAPPT_GEN_ALL_CORE_FT
- follow up with Neurology for vestibular therapy  - follow up with cardiology, Dr. Diaz Goddard  - follow up with PCP, () arrange for non urgent thyroid US for incidental thyroid nodule

## 2021-03-19 ENCOUNTER — TRANSCRIPTION ENCOUNTER (OUTPATIENT)
Age: 86
End: 2021-03-19

## 2021-03-19 VITALS
OXYGEN SATURATION: 98 % | SYSTOLIC BLOOD PRESSURE: 107 MMHG | HEART RATE: 76 BPM | DIASTOLIC BLOOD PRESSURE: 69 MMHG | RESPIRATION RATE: 18 BRPM | TEMPERATURE: 98 F

## 2021-03-19 LAB
ALBUMIN SERPL ELPH-MCNC: 3.6 G/DL — SIGNIFICANT CHANGE UP (ref 3.3–5)
ALP SERPL-CCNC: 28 U/L — LOW (ref 40–120)
ALT FLD-CCNC: 11 U/L — SIGNIFICANT CHANGE UP (ref 10–45)
ANION GAP SERPL CALC-SCNC: 12 MMOL/L — SIGNIFICANT CHANGE UP (ref 5–17)
AST SERPL-CCNC: 20 U/L — SIGNIFICANT CHANGE UP (ref 10–40)
BASOPHILS # BLD AUTO: 0.05 K/UL — SIGNIFICANT CHANGE UP (ref 0–0.2)
BASOPHILS NFR BLD AUTO: 0.9 % — SIGNIFICANT CHANGE UP (ref 0–2)
BILIRUB SERPL-MCNC: 0.7 MG/DL — SIGNIFICANT CHANGE UP (ref 0.2–1.2)
BUN SERPL-MCNC: 35 MG/DL — HIGH (ref 7–23)
CALCIUM SERPL-MCNC: 9.2 MG/DL — SIGNIFICANT CHANGE UP (ref 8.4–10.5)
CHLORIDE SERPL-SCNC: 106 MMOL/L — SIGNIFICANT CHANGE UP (ref 96–108)
CO2 SERPL-SCNC: 19 MMOL/L — LOW (ref 22–31)
COVID-19 SPIKE DOMAIN AB INTERP: POSITIVE
COVID-19 SPIKE DOMAIN ANTIBODY RESULT: 92.9 AU/ML — HIGH
CREAT SERPL-MCNC: 1.59 MG/DL — HIGH (ref 0.5–1.3)
EOSINOPHIL # BLD AUTO: 0.15 K/UL — SIGNIFICANT CHANGE UP (ref 0–0.5)
EOSINOPHIL NFR BLD AUTO: 2.7 % — SIGNIFICANT CHANGE UP (ref 0–6)
GLUCOSE BLDC GLUCOMTR-MCNC: 112 MG/DL — HIGH (ref 70–99)
GLUCOSE BLDC GLUCOMTR-MCNC: 91 MG/DL — SIGNIFICANT CHANGE UP (ref 70–99)
GLUCOSE SERPL-MCNC: 99 MG/DL — SIGNIFICANT CHANGE UP (ref 70–99)
HCT VFR BLD CALC: 39 % — SIGNIFICANT CHANGE UP (ref 34.5–45)
HGB BLD-MCNC: 12.5 G/DL — SIGNIFICANT CHANGE UP (ref 11.5–15.5)
IMM GRANULOCYTES NFR BLD AUTO: 0.4 % — SIGNIFICANT CHANGE UP (ref 0–1.5)
LYMPHOCYTES # BLD AUTO: 1.78 K/UL — SIGNIFICANT CHANGE UP (ref 1–3.3)
LYMPHOCYTES # BLD AUTO: 31.7 % — SIGNIFICANT CHANGE UP (ref 13–44)
MAGNESIUM SERPL-MCNC: 2.1 MG/DL — SIGNIFICANT CHANGE UP (ref 1.6–2.6)
MCHC RBC-ENTMCNC: 25.9 PG — LOW (ref 27–34)
MCHC RBC-ENTMCNC: 32.1 GM/DL — SIGNIFICANT CHANGE UP (ref 32–36)
MCV RBC AUTO: 80.7 FL — SIGNIFICANT CHANGE UP (ref 80–100)
MONOCYTES # BLD AUTO: 0.71 K/UL — SIGNIFICANT CHANGE UP (ref 0–0.9)
MONOCYTES NFR BLD AUTO: 12.7 % — SIGNIFICANT CHANGE UP (ref 2–14)
NEUTROPHILS # BLD AUTO: 2.9 K/UL — SIGNIFICANT CHANGE UP (ref 1.8–7.4)
NEUTROPHILS NFR BLD AUTO: 51.6 % — SIGNIFICANT CHANGE UP (ref 43–77)
NRBC # BLD: 0 /100 WBCS — SIGNIFICANT CHANGE UP (ref 0–0)
PLATELET # BLD AUTO: 153 K/UL — SIGNIFICANT CHANGE UP (ref 150–400)
POTASSIUM SERPL-MCNC: 4.2 MMOL/L — SIGNIFICANT CHANGE UP (ref 3.5–5.3)
POTASSIUM SERPL-SCNC: 4.2 MMOL/L — SIGNIFICANT CHANGE UP (ref 3.5–5.3)
PROT SERPL-MCNC: 6.5 G/DL — SIGNIFICANT CHANGE UP (ref 6–8.3)
RBC # BLD: 4.83 M/UL — SIGNIFICANT CHANGE UP (ref 3.8–5.2)
RBC # FLD: 14.7 % — HIGH (ref 10.3–14.5)
SARS-COV-2 IGG+IGM SERPL QL IA: 92.9 AU/ML — HIGH
SARS-COV-2 IGG+IGM SERPL QL IA: POSITIVE
SODIUM SERPL-SCNC: 137 MMOL/L — SIGNIFICANT CHANGE UP (ref 135–145)
WBC # BLD: 5.61 K/UL — SIGNIFICANT CHANGE UP (ref 3.8–10.5)
WBC # FLD AUTO: 5.61 K/UL — SIGNIFICANT CHANGE UP (ref 3.8–10.5)

## 2021-03-19 PROCEDURE — 85025 COMPLETE CBC W/AUTO DIFF WBC: CPT

## 2021-03-19 PROCEDURE — 81003 URINALYSIS AUTO W/O SCOPE: CPT

## 2021-03-19 PROCEDURE — 85730 THROMBOPLASTIN TIME PARTIAL: CPT

## 2021-03-19 PROCEDURE — 70496 CT ANGIOGRAPHY HEAD: CPT

## 2021-03-19 PROCEDURE — 80053 COMPREHEN METABOLIC PANEL: CPT

## 2021-03-19 PROCEDURE — 70498 CT ANGIOGRAPHY NECK: CPT

## 2021-03-19 PROCEDURE — 83880 ASSAY OF NATRIURETIC PEPTIDE: CPT

## 2021-03-19 PROCEDURE — 83735 ASSAY OF MAGNESIUM: CPT

## 2021-03-19 PROCEDURE — U0005: CPT

## 2021-03-19 PROCEDURE — 83036 HEMOGLOBIN GLYCOSYLATED A1C: CPT

## 2021-03-19 PROCEDURE — 70450 CT HEAD/BRAIN W/O DYE: CPT

## 2021-03-19 PROCEDURE — 80048 BASIC METABOLIC PNL TOTAL CA: CPT

## 2021-03-19 PROCEDURE — 84443 ASSAY THYROID STIM HORMONE: CPT

## 2021-03-19 PROCEDURE — 71045 X-RAY EXAM CHEST 1 VIEW: CPT

## 2021-03-19 PROCEDURE — 97161 PT EVAL LOW COMPLEX 20 MIN: CPT

## 2021-03-19 PROCEDURE — U0003: CPT

## 2021-03-19 PROCEDURE — 84100 ASSAY OF PHOSPHORUS: CPT

## 2021-03-19 PROCEDURE — 87086 URINE CULTURE/COLONY COUNT: CPT

## 2021-03-19 PROCEDURE — 99285 EMERGENCY DEPT VISIT HI MDM: CPT | Mod: 25

## 2021-03-19 PROCEDURE — 84480 ASSAY TRIIODOTHYRONINE (T3): CPT

## 2021-03-19 PROCEDURE — 85610 PROTHROMBIN TIME: CPT

## 2021-03-19 PROCEDURE — 85027 COMPLETE CBC AUTOMATED: CPT

## 2021-03-19 PROCEDURE — 84436 ASSAY OF TOTAL THYROXINE: CPT

## 2021-03-19 PROCEDURE — 84484 ASSAY OF TROPONIN QUANT: CPT

## 2021-03-19 PROCEDURE — 82962 GLUCOSE BLOOD TEST: CPT

## 2021-03-19 PROCEDURE — C8929: CPT

## 2021-03-19 PROCEDURE — 86769 SARS-COV-2 COVID-19 ANTIBODY: CPT

## 2021-03-19 PROCEDURE — 99239 HOSP IP/OBS DSCHRG MGMT >30: CPT

## 2021-03-19 RX ADMIN — Medication 25 MILLIGRAM(S): at 06:38

## 2021-03-19 RX ADMIN — Medication 650 MILLIGRAM(S): at 13:00

## 2021-03-19 RX ADMIN — Medication 650 MILLIGRAM(S): at 00:03

## 2021-03-19 RX ADMIN — LISINOPRIL 5 MILLIGRAM(S): 2.5 TABLET ORAL at 06:38

## 2021-03-19 RX ADMIN — Medication 2000 UNIT(S): at 12:28

## 2021-03-19 RX ADMIN — Medication 88 MICROGRAM(S): at 05:58

## 2021-03-19 RX ADMIN — Medication 650 MILLIGRAM(S): at 12:28

## 2021-03-19 NOTE — PROGRESS NOTE ADULT - PROBLEM SELECTOR PLAN 5
bp stable and not orthostatic  -- c/w home lisinopril 5mg and metoprolol 25mg

## 2021-03-19 NOTE — PROGRESS NOTE ADULT - PROBLEM SELECTOR PLAN 3
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

## 2021-03-19 NOTE — DISCHARGE NOTE NURSING/CASE MANAGEMENT/SOCIAL WORK - PATIENT PORTAL LINK FT
You can access the FollowMyHealth Patient Portal offered by [facility] by registering at the following website: [email or web address]. By joining [facility]’s FollowMyHealth portal, you will also be able to view your health information using other applications (apps) compatible with our system.

## 2021-03-19 NOTE — PROGRESS NOTE ADULT - PROBLEM SELECTOR PLAN 1
-Endorses periodic dizziness and presyncope. Secondary to SVTs seen on outpatient interogation vs vertigo  - orthostatic vitals negative  - fall precautions  - c/w telemetry monitoring  - cardiology and EP rec TTE and reintegrate pacemaker here, will call house EP  - cta head/neck: no acute cva,  Less than 50% bilaterally ICA stenosis   -per neuro no further workup, outpatient vestibular therapy   -PT eval  -trop 22, will add 2nd trop
dizziness now resolved  -Endorses periodic dizziness likely vertigo related given onset due to turning head with spinning sensation  - pacemaker interrogation negative for events  - TTE wnl, normal EF, no WMA  - cards and EP: patient cleared for d/c outpatient f/u Dr Diaz Goddard  - orthostatic vitals negative  - fall precautions  - tele no events,   - cta head/neck: no acute cva,  Less than 50% bilaterally ICA stenosis   - trops flat  -per neuro no further workup, outpatient vestibular therapy
dizziness now rseolved  -Endorses periodic dizziness likely vertigo related given onset due to turning head with spinning sensation  - pacemaker interrogation negative for events  - TTE wnl, normal EF, no WMA  - cards and EP: patient cleared for d/c outpatient f/u Dr Diaz Goddard  - orthostatic vitals negative  - fall precautions  - tele no events,   - cta head/neck: no acute cva,  Less than 50% bilaterally ICA stenosis   - trops flat  -per neuro no further workup, outpatient vestibular therapy

## 2021-03-19 NOTE — PROGRESS NOTE ADULT - PROBLEM SELECTOR PROBLEM 2
SVT (supraventricular tachycardia)

## 2021-03-19 NOTE — PROGRESS NOTE ADULT - PROVIDER SPECIALTY LIST ADULT
Electrophysiology
Cardiology
Cardiology
Electrophysiology
Neurology
Neurology
Hospitalist

## 2021-03-19 NOTE — PROGRESS NOTE ADULT - PROBLEM SELECTOR PLAN 8
Bret is here today for   Chief Complaint   Patient presents with   • Transitional Care Management     TCM follow up-rectal bleeding    .        Medication Refills needed today?  NO,   if you receive a prescription today would you like it to be sent to [facility]? NO      Patient would like communication of their results via:    Home Phone: [phone] (home)  Okay to leave a message containing results? Yes          Health Maintenance Summary     Hepatitis B Vaccine (1 of 3 - Risk 3-dose series)  Overdue since 7/7/1962    Shingles Vaccine (1 of 2)  Overdue since 7/7/1993    DTaP/Tdap/Td Vaccine (2 - Td)  Overdue since 12/23/2019    DM/CKD Microalbumin (Yearly)  Overdue since 1/2/2020    Diabetes Eye Exam (Yearly)  Due soon on 9/24/2020    Influenza Vaccine (1)  Next due on 9/1/2020    Diabetes Foot Exam (Yearly)  Next due on 12/5/2020    Diabetes A1C (Every 6 Months)  Ordered on 7/2/2020    Medicare Wellness Visit (Yearly)  Next due on 7/2/2021    Depression Screening (Yearly)  Next due on 7/20/2021    DM/CKD GFR (Yearly)  Ordered on 7/2/2020    Pneumococcal Vaccine 65+   Completed    Meningococcal Vaccine   Aged Out          Patient is due for topics as listed above but is not proceeding with Immunization(s) Dtap/Tdap/Td and Shingles and Diabetes Eye Exam at this time. Shingrix advised to pharmacy-Tdap declined unless injury .              
incidental 1.0 x 0.8 cm right thyroid lobe nodule.   -Recommend nonemergent thyroid ultrasound as clinically warranted  -pt informed and will with pcp for outpatient US

## 2021-03-19 NOTE — PROGRESS NOTE ADULT - PROBLEM SELECTOR PLAN 6
- Patient on Jardiance at home.  -c/w LANEY, monitor FS

## 2021-03-19 NOTE — PROGRESS NOTE ADULT - PROBLEM SELECTOR PLAN 10
DVT ppx: Xarelto  Diet: DASH/TLC  PT eval
DVT ppx: Xarelto, PT rec outpatient PT  Dispo: stable for discharge home today with outpatient card/EP/PCP followp 1 week  spent 40 min on d/c time
DVT ppx: Xarelto, PT rec outpatient PT  Dispo: stable for discharge home, but patient says  cannot get her until olaf morning and cannot go home alone

## 2021-03-19 NOTE — PROGRESS NOTE ADULT - SUBJECTIVE AND OBJECTIVE BOX
Neurology Progress Note    S: Patient seen and examined. No new events overnight. patient denied CP, SOB, HA or pain.     Medication:  acetaminophen   Tablet .. 650 milliGRAM(s) Oral every 6 hours PRN  atorvastatin 40 milliGRAM(s) Oral at bedtime  cholecalciferol 2000 Unit(s) Oral daily  dextrose 40% Gel 15 Gram(s) Oral once  dextrose 5%. 1000 milliLiter(s) IV Continuous <Continuous>  dextrose 5%. 1000 milliLiter(s) IV Continuous <Continuous>  dextrose 50% Injectable 25 Gram(s) IV Push once  dextrose 50% Injectable 12.5 Gram(s) IV Push once  dextrose 50% Injectable 25 Gram(s) IV Push once  glucagon  Injectable 1 milliGRAM(s) IntraMuscular once  insulin lispro (ADMELOG) corrective regimen sliding scale   SubCutaneous three times a day before meals  insulin lispro (ADMELOG) corrective regimen sliding scale   SubCutaneous at bedtime  levothyroxine 88 MICROGram(s) Oral daily  lisinopril 5 milliGRAM(s) Oral daily  metoprolol succinate ER 25 milliGRAM(s) Oral daily  rivaroxaban 15 milliGRAM(s) Oral with dinner      Vitals:  Vital Signs Last 24 Hrs  T(C): 36.5 (19 Mar 2021 04:40), Max: 36.6 (18 Mar 2021 18:56)  T(F): 97.7 (19 Mar 2021 04:40), Max: 97.8 (18 Mar 2021 18:56)  HR: 73 (19 Mar 2021 06:37) (68 - 80)  BP: 120/74 (19 Mar 2021 06:37) (107/71 - 120/86)  BP(mean): --  RR: 18 (19 Mar 2021 04:40) (18 - 18)  SpO2: 96% (19 Mar 2021 04:40) (96% - 97%)    General Exam:   General Appearance: Appropriately dressed and in no acute distress       Head: Normocephalic, atraumatic and no dysmorphic features  Ear, Nose, and Throat: Moist mucous membranes  CVS: S1S2+  Resp: No SOB, no wheeze or rhonchi  Abd: soft NTND  Extremities: No edema, no cyanosis  Skin: No bruises, no rashes     Neurological Exam:  Mental Status: Awake, alert and oriented x 3.  Able to follow simple and complex verbal commands. Able to name and repeat. fluent speech. No obvious aphasia or dysarthria noted.   Cranial Nerves: PERRL, EOMI, VFFC, sensation V1-V3 intact,  no obvious facial asymmetry , equal elevation of palate, scm/trap 5/5, tongue is midline on protrusion. no obvious papilledema on fundoscopic exam. Hearing is grossly intact.   Motor: Normal bulk, tone and strength throughout. Fine finger movements were intact and symmetric. no tremors or drift noted.    Sensation: Intact to light touch and pinprick throughout. no right/left confusion. no extinction to tactile on DSS. Romberg was negative.   Reflexes: 1+ throughout at biceps, brachioradialis, triceps, patellars and ankles bilaterally and equal. No clonus. R toe and L toe were both downgoing.  Coordination: No dysmetria on FNF   Gait: cautious gait. walked with PT    I personally reviewed the below data/images/labs:      CBC Full  -  ( 19 Mar 2021 06:58 )  WBC Count : 5.61 K/uL  RBC Count : 4.83 M/uL  Hemoglobin : 12.5 g/dL  Hematocrit : 39.0 %  Platelet Count - Automated : 153 K/uL  Mean Cell Volume : 80.7 fl  Mean Cell Hemoglobin : 25.9 pg  Mean Cell Hemoglobin Concentration : 32.1 gm/dL  Auto Neutrophil # : 2.90 K/uL  Auto Lymphocyte # : 1.78 K/uL  Auto Monocyte # : 0.71 K/uL  Auto Eosinophil # : 0.15 K/uL  Auto Basophil # : 0.05 K/uL  Auto Neutrophil % : 51.6 %  Auto Lymphocyte % : 31.7 %  Auto Monocyte % : 12.7 %  Auto Eosinophil % : 2.7 %  Auto Basophil % : 0.9 %    03-19    137  |  106  |  35<H>  ----------------------------<  99  4.2   |  19<L>  |  1.59<H>    Ca    9.2      19 Mar 2021 06:58  Mg     2.1     03-19    TPro  6.5  /  Alb  3.6  /  TBili  0.7  /  DBili  x   /  AST  20  /  ALT  11  /  AlkPhos  28<L>  03-19    LIVER FUNCTIONS - ( 19 Mar 2021 06:58 )  Alb: 3.6 g/dL / Pro: 6.5 g/dL / ALK PHOS: 28 U/L / ALT: 11 U/L / AST: 20 U/L / GGT: x             < from: CT Angio Neck w/ IV Cont (03.16.21 @ 17:19) >    EXAM:  CT ANGIO BRAIN (W)AW IC                          EXAM:  CT ANGIO NECK (W)AW IC                          EXAM:  CT BRAIN                            PROCEDURE DATE:  03/16/2021            INTERPRETATION:  CLINICAL INFORMATION: Unsteady gait, evaluate for stroke    TECHNIQUE:  1.  A noncontrast head CT scan was reconstructed into 5 mm thick axial slices.  2.  Contrast enhanced CT angiography of the neck and head was performed.  MIP reformats were generated in the coronal, sagittal and axialplanes.  3.  A postcontrast head CT scan was reconstructed into 5 mm thick axial slices.    Intravenous contrast: 90 cc Omnipaque 350 were administered; 10 cc were discarded    COMPARISON: None.    FINDINGS:    CT BRAIN:    No acute intracranial hemorrhage, mass effect, or midline shift. No abnormal extra-axial fluid collections. No acute cerebral cortical infarct. The basal cisterns are patent without evidence of central herniation. No hydrocephalus.    Moderate cerebral volume loss with proportional prominence of the sulci and ventricles. Patchy periventricular white matter hypoattenuation, likely related to chronic microvascular ischemic disease.    The calvarium is intact. The soft tissues of the scalp are unremarkable.  The visualized paranasal sinuses are clear. The mastoid air cells are clear.      CT ANGIOGRAPHY NECK:    Three-vessel aortic arch. The origins of the great vessels are unremarkable. The common carotid arteries are normal in caliber, significant stenosis.    The carotid bifurcations demonstrate mild atherosclerotic changes. The internal carotid arteries demonstrate mild atherosclerotic changes, with less than 50% stenosis.    Co-dominant vertebral arteries. The vertebral arteries are normal in caliber without significant stenosis. Left side dominant.    Single cyst within the left upper lobe (series 2, image 29). 1.0 x 0.8 cm right thyroid lobe nodule (series 2, image 57). Subcentimeter mediastinal lymph node (series 2, image 22). Visualized osseous structuresare unremarkable.      CT ANGIOGRAPHY BRAIN:    Anterior circulation: Calcifications of the carotid siphons bilaterally. The bilateral anterior cerebral and middle cerebral arteries are patent without evidence of significant stenosis, major vessel occlusion, or aneurysm.    Posterior circulation: The distal vertebral arteries, basilar artery, and bilateral posterior cerebral arteries are patent without evidence of significant stenosis, major vessel occlusion, or aneurysm.    No enlarged vascular lesions or clusters of abnormal vessels are noted to suggest an arterial venous malformation.    Visualized portions of the superficial and deep venous systems are unremarkable.      IMPRESSION:    CT brain: No acute intracranial hemorrhage, mass effect, midline shift or acute cerebral cortical infarct.    CT angiography neck: Less than 50% bilaterally ICA stenosis by NASCET criteria. No vascular dissection. 1.0 x 0.8 cm right thyroid lobe nodule. Recommend nonemergent thyroid ultrasound as clinically warranted    CT angiography brain: No major vessel occlusion or proximal stenosis. No evidence of aneurysm or other vascular malformation.      _______________________________________  NASCET criteria for internal carotid artery stenosis:  Mild: 0% to 49%  Moderate: 50% to 69%  Severe: 70% to 99%              BARON CAMPOS MD; Resident Radiology  This document has been electronically signed.  DANIELA RIVERA MD, Attending Radiologist  This document has been electronically signed. Mar 16 [id number]:[id number]    < end of copied text >    
S: Denies further dizziness, no chest pain, palpitations, or SOB. Review of systems otherwise (-)    Review of Systems:   Constitutional: [ ] fevers, [ ] chills.   Skin: [ ] dry skin. [ ] rashes.  Psychiatric: [ ] depression, [ ] anxiety.   Gastrointestinal: [ ] BRBPR, [ ] melena.   Neurological: [ ] confusion. [ ] seizures. [ ] shuffling gait.   Ears,Nose,Mouth and Throat: [ ] ear pain [ ] sore throat.   Eyes: [ ] diplopia.   Respiratory: [ ] hemoptysis. [ ] shortness of breath  Cardiovascular: See HPI above  Hematologic/Lymphatic: [ ] anemia. [ ] painful nodes. [ ] prolonged bleeding.   Genitourinary: [ ] hematuria. [ ] flank pain.   Endocrine: [ ] significant change in weight. [ ] intolerance to heat and cold.     Review of systems [ x] otherwise negative, [ ] otherwise unable to obtain    FH: no family history of sudden cardiac death in first degree relatives    SH: [ ] tobacco, [ ] alcohol, [ ] drugs    atorvastatin 40 milliGRAM(s) Oral at bedtime  cholecalciferol 2000 Unit(s) Oral daily  dextrose 40% Gel 15 Gram(s) Oral once  dextrose 5%. 1000 milliLiter(s) IV Continuous <Continuous>  dextrose 5%. 1000 milliLiter(s) IV Continuous <Continuous>  dextrose 50% Injectable 25 Gram(s) IV Push once  dextrose 50% Injectable 12.5 Gram(s) IV Push once  dextrose 50% Injectable 25 Gram(s) IV Push once  glucagon  Injectable 1 milliGRAM(s) IntraMuscular once  insulin lispro (ADMELOG) corrective regimen sliding scale   SubCutaneous three times a day before meals  insulin lispro (ADMELOG) corrective regimen sliding scale   SubCutaneous at bedtime  levothyroxine 88 MICROGram(s) Oral daily  lisinopril 5 milliGRAM(s) Oral daily  metoprolol succinate ER 25 milliGRAM(s) Oral daily  rivaroxaban 15 milliGRAM(s) Oral with dinner                            13.2   5.85  )-----------( 159      ( 18 Mar 2021 06:13 )             41.7       03-18    139  |  105  |  21  ----------------------------<  97  3.9   |  22  |  1.11    Ca    9.6      18 Mar 2021 06:13  Phos  3.3     03-16  Mg     2.0     03-18    TPro  6.5  /  Alb  3.6  /  TBili  1.0  /  DBili  x   /  AST  16  /  ALT  12  /  AlkPhos  28<L>  03-18            T(C): 36.4 (03-18-21 @ 12:03), Max: 36.7 (03-18-21 @ 04:48)  HR: 80 (03-18-21 @ 12:03) (79 - 92)  BP: 107/71 (03-18-21 @ 12:03) (107/71 - 147/79)  RR: 18 (03-18-21 @ 12:03) (18 - 18)  SpO2: 96% (03-18-21 @ 12:03) (96% - 99%)  Wt(kg): --    I&O's Summary    17 Mar 2021 07:01  -  18 Mar 2021 07:00  --------------------------------------------------------  IN: 280 mL / OUT: 0 mL / NET: 280 mL    18 Mar 2021 07:01  -  18 Mar 2021 13:22  --------------------------------------------------------  IN: 240 mL / OUT: 0 mL / NET: 240 mL      General: Well nourished in no acute distress. Alert and Oriented * 3.   Head: Normocephalic and atraumatic.   Neck: No JVD. No bruits. Supple. Does not appear to be enlarged.   Cardiovascular: + S1,S2 ; RRR Soft systolic murmur at the left lower sternal border. No rubs noted.    Lungs: CTA b/l. No rhonchi, rales or wheezes.   Abdomen: + BS, soft. Non tender. Non distended. No rebound. No guarding.   Extremities: No clubbing/cyanosis/edema.   Neurologic: Moves all four extremities. Full range of motion.   Skin: Warm and moist. The patient's skin has normal elasticity and good skin turgor.   Psychiatric: Appropriate mood and affect.  Musculoskeletal: Normal range of motion, normal strength    TELEMETRY: SR    < from: TTE with Doppler (w/Cont) (03.17.21 @ 14:22) >  Observations:  Mitral Valve: Normal mitral valve. Mitral annular  calcification.  Aortic Valve/Aorta: Fibrocalcific aortic valve without  stenosis.  .  Normal aortic root.  Left Atrium: Normal left atrium.  Left Ventricle: Endocardial visualization enhanced with  intravenous injection of Ultrasonic Enhancing Agent  (Definity).  Normal left ventricular internal dimensions and wall  thicknesses.  Normal left ventricular systolic function. No segmental  wall motion abnormalities.  Impaired LV-relaxation with normal filling pressure.  Right Heart: Normal right atrium. Normal right ventricular  size and function.  Normal tricuspid valve. Minimal tricuspid regurgitation.  Normal pulmonic valve.  Pericardium/Pleura: Normal pericardium with no pericardial  effusion.  Hemodynamic: Estimated right atrial pressue is normal.  No evidence of pulmonary hypertension.    < end of copied text >      ASSESSMENT/PLAN: 89 year old female with history of PAF on ac with NOAC, history of ILR, s/p MDT PPM for tachy-inocencia syndrome, HTN, DM, HLD, asthma who is being seen for palpitations.    -pt. currently symptom free  -pt. with indeterminate troponin with no anginal symptoms - do not suspect acs   -CTH negative for CVA  -Orthostatics negative  -PPM interrogation noted with no events  -EP eval appreciated - f/u final recs  -TTE noted with normal LV function, no sig valvular abnormalities  -No further inpatient cardiac w/u planned  -Patient to f/u with her cardiologist Dr. Diaz Goddard after d/c    Presley Britt PA-C  Pager: [phone]    
EP ATTENDING    tele: NSR 60-90s    she denies palpitations, syncope, nor angina, but continues to be dizzy (no events on tele, PPM interrogation unremarkable)          Review of Systems:   Constitutional: [ ] fevers, [ ] chills.   Skin: [ ] dry skin. [ ] rashes.  Psychiatric: [ ] depression, [ ] anxiety.   Gastrointestinal: [ ] BRBPR, [ ] melena.   Neurological: [ ] confusion. [ ] seizures. [ ] shuffling gait.   Ears,Nose,Mouth and Throat: [ ] ear pain [ ] sore throat.   Eyes: [ ] diplopia.   Respiratory: [ ] hemoptysis. [ ] shortness of breath  Cardiovascular: See HPI above  Hematologic/Lymphatic: [ ] anemia. [ ] painful nodes. [ ] prolonged bleeding.   Genitourinary: [ ] hematuria. [ ] flank pain.   Endocrine: [ ] significant change in weight. [ ] intolerance to heat and cold.     Review of systems [ x] otherwise negative, [ ] otherwise unable to obtain    FH: no family history of sudden cardiac death in first degree relatives    SH: [ ] tobacco, [ ] alcohol, [ ] drugs    acetaminophen   Tablet .. 650 milliGRAM(s) Oral every 6 hours PRN  atorvastatin 40 milliGRAM(s) Oral at bedtime  cholecalciferol 2000 Unit(s) Oral daily  dextrose 40% Gel 15 Gram(s) Oral once  dextrose 5%. 1000 milliLiter(s) IV Continuous <Continuous>  dextrose 5%. 1000 milliLiter(s) IV Continuous <Continuous>  dextrose 50% Injectable 25 Gram(s) IV Push once  dextrose 50% Injectable 12.5 Gram(s) IV Push once  dextrose 50% Injectable 25 Gram(s) IV Push once  glucagon  Injectable 1 milliGRAM(s) IntraMuscular once  insulin lispro (ADMELOG) corrective regimen sliding scale   SubCutaneous three times a day before meals  insulin lispro (ADMELOG) corrective regimen sliding scale   SubCutaneous at bedtime  levothyroxine 88 MICROGram(s) Oral daily  lisinopril 5 milliGRAM(s) Oral daily  metoprolol succinate ER 25 milliGRAM(s) Oral daily  rivaroxaban 15 milliGRAM(s) Oral with dinner                            12.5   5.61  )-----------( 153      ( 19 Mar 2021 06:58 )             39.0       03-19    137  |  106  |  35<H>  ----------------------------<  99  4.2   |  19<L>  |  1.59<H>    Ca    9.2      19 Mar 2021 06:58  Mg     2.1     03-19    TPro  6.5  /  Alb  3.6  /  TBili  0.7  /  DBili  x   /  AST  20  /  ALT  11  /  AlkPhos  28<L>  03-19            T(C): 36.5 (03-19-21 @ 04:40), Max: 36.6 (03-18-21 @ 18:56)  HR: 73 (03-19-21 @ 06:37) (68 - 73)  BP: 120/74 (03-19-21 @ 06:37) (109/67 - 120/74)  RR: 18 (03-19-21 @ 04:40) (18 - 18)  SpO2: 96% (03-19-21 @ 04:40) (96% - 96%)  Wt(kg): --    I&O's Summary    18 Mar 2021 07:01  -  19 Mar 2021 07:00  --------------------------------------------------------  IN: 480 mL / OUT: 0 mL / NET: 480 mL        General: Well nourished in no acute distress. Alert and Oriented * 3.   Head: Normocephalic and atraumatic.   Neck: No JVD. No bruits. Supple. Does not appear to be enlarged.   Cardiovascular: + S1,S2 ; RRR Soft systolic murmur at the left lower sternal border. No rubs noted.    Lungs: CTA b/l. No rhonchi, rales or wheezes.   Abdomen: + BS, soft. Non tender. Non distended. No rebound. No guarding.   Extremities: No clubbing/cyanosis/edema.   Neurologic: Moves all four extremities. Full range of motion.   Skin: Warm and moist. The patient's skin has normal elasticity and good skin turgor.   Psychiatric: Appropriate mood and affect.  Musculoskeletal: Normal range of motion, normal strength    echo normal    A/P) 89 y/o female s/p medtronic dual chamber PPM a/w dizziness. PPM interrogation and tele and echo unremarkable    -no further inpatient EP workup needed  -f/u with her electrophysiologist Dr Yang House after discharge as scheduled      Sahil Vail M.D., [facility]  Cardiac Electrophysiology  [facility]  [address] [place] [address], [place] [address]  [email or web address]    office [phone]  pager [phone]  
EP Attending    HISTORY OF PRESENT ILLNESS: HPI:  89 yo F with a pmhx of DM, HTN, hyperlipidemia, asthma, afib on eliquis, ILR tachy-inocencia syndrome s/p PPM, hypothyroidism presenting with complaints of weakness and dizziness for the past week. Patient presented from OP cardiologist office after device interrogation revealed multiple runs of SVT up to 170bpm that lasted for longer than 58 seconds.     Patient states that she was in [place] last week visiting her daughter and felt dizzy and lightheaded. She states that this occurs regardless of whether she is sitting or standing. She states that she returned from [place] on Saturday and continued to feel weak and like she is "losing balance". She adds that it feels like the room is spinning and cannot recall any provoking factors to the dizziness. She states that on the morning on 3/16, she felt the dizziness to be at its worst and presented to her cardiologist Dr. Diaz Goddard's office. She states that she also felt that she was going to faint. Per note at bedside and over the phone with Dr. Goddard, orthostatic vitals were taken and BP was 150/90 both supine and standing. Dr. Goddard adds that multiple runs of SVT up to 170bpm were noted on the PPM that lasted for longer than 58 seconds. Patient was then recommended to go to the ED for further evaluation and potential Watchman device eval. She denies CP, SOB, palpitations, subjective f/c, n/v.    3/17- Ms Taveras has a dual chamber Medtronic pacemaker by Dr House, who she sees regularly for AF management and device checks.  States she has not been having palpitations, but has been feeling lightheaded and 'a little bit drunk' when she moves around her home.  No angina, orthopnea, fainting or falls.  She is anticaogulated w/ reduced-dose Rivaroxaban, and has no bleeding issues. A 10 pt ROS is otherwise negative.  3/18- feeling well today.  Pacemaker checked, and with no AFib, and normal pacing function.  no angina, orthopnea or palpitations.     atorvastatin 40 milliGRAM(s) Oral at bedtime  cholecalciferol 2000 Unit(s) Oral daily  dextrose 40% Gel 15 Gram(s) Oral once  dextrose 5%. 1000 milliLiter(s) IV Continuous <Continuous>  dextrose 5%. 1000 milliLiter(s) IV Continuous <Continuous>  dextrose 50% Injectable 25 Gram(s) IV Push once  dextrose 50% Injectable 12.5 Gram(s) IV Push once  dextrose 50% Injectable 25 Gram(s) IV Push once  glucagon  Injectable 1 milliGRAM(s) IntraMuscular once  insulin lispro (ADMELOG) corrective regimen sliding scale   SubCutaneous three times a day before meals  insulin lispro (ADMELOG) corrective regimen sliding scale   SubCutaneous at bedtime  levothyroxine 88 MICROGram(s) Oral daily  lisinopril 5 milliGRAM(s) Oral daily  metoprolol succinate ER 25 milliGRAM(s) Oral daily  rivaroxaban 15 milliGRAM(s) Oral with dinner                        13.2   5.85  )-----------( 159      ( 18 Mar 2021 06:13 )             41.7       03-18    139  |  105  |  21  ----------------------------<  97  3.9   |  22  |  1.11    Ca    9.6      18 Mar 2021 06:13  Phos  3.3     03-16  Mg     2.0     03-18    TPro  6.5  /  Alb  3.6  /  TBili  1.0  /  DBili  x   /  AST  16  /  ALT  12  /  AlkPhos  28<L>  03-18    T(C): 36.4 (03-18-21 @ 12:03), Max: 36.7 (03-18-21 @ 04:48)  HR: 80 (03-18-21 @ 12:03) (79 - 92)  BP: 107/71 (03-18-21 @ 12:03) (107/71 - 147/79)  RR: 18 (03-18-21 @ 12:03) (18 - 18)  SpO2: 96% (03-18-21 @ 12:03) (96% - 99%)  Wt(kg): --    I&O's Summary    17 Mar 2021 07:01  -  18 Mar 2021 07:00  --------------------------------------------------------  IN: 280 mL / OUT: 0 mL / NET: 280 mL    18 Mar 2021 07:01  -  18 Mar 2021 15:09  --------------------------------------------------------  IN: 240 mL / OUT: 0 mL / NET: 240 mL    General: Well nourished older woman in no acute distress, alert and oriented x 3  Head: normocephalic, no trauma  Neck: no JVD, no bruit, supple, not enlarged  CV: S1S2, no S3, regular rate, rhythm is SINUS, pacemaker in left upper chest, no murmurs.    Lungs: clear BL, no rales or wheezes  Abdomen: bowel sounds +, soft, nontender, nondistended  Extremities: no clubbing, cyanosis or edema  Neuro: Moves all 4 extremities, sensation intact x 4 extremities  Skin: warm and moist, normal turgor  Psych: Mood and affect are appropriate for circumstances  MSK: normal range of motion and strength x4 extremities.      TELEMETRY: NSR  ECG: NSR  	  ASSESSMENT/PLAN: 	89y Female with dizzyness and symptomatic lightheadedness during activity.    Seen by neurology, considering vestibular dysfunction.  Pacemaker interrogation complete, with normal function and no atrial fibrillation- no cardiac rhythm explanation for her symptoms.  If so, I can discuss management w/ Dr House prior to her return to his office.  Remainder of Cardiac workup per Dr Hills/Dr Montoya.    Sebastian Krause M.D.  Cardiac Electrophysiology    office [phone]  pager [phone]
S: Denies dizziness, chest pain, palpitations, or SOB. Review of systems otherwise (-)    Review of Systems:   Constitutional: [ ] fevers, [ ] chills.   Skin: [ ] dry skin. [ ] rashes.  Psychiatric: [ ] depression, [ ] anxiety.   Gastrointestinal: [ ] BRBPR, [ ] melena.   Neurological: [ ] confusion. [ ] seizures. [ ] shuffling gait.   Ears,Nose,Mouth and Throat: [ ] ear pain [ ] sore throat.   Eyes: [ ] diplopia.   Respiratory: [ ] hemoptysis. [ ] shortness of breath  Cardiovascular: See HPI above  Hematologic/Lymphatic: [ ] anemia. [ ] painful nodes. [ ] prolonged bleeding.   Genitourinary: [ ] hematuria. [ ] flank pain.   Endocrine: [ ] significant change in weight. [ ] intolerance to heat and cold.     Review of systems [ x] otherwise negative, [ ] otherwise unable to obtain    FH: no family history of sudden cardiac death in first degree relatives    SH: [ ] tobacco, [ ] alcohol, [ ] drugs      MEDICATIONS  (STANDING):  atorvastatin 40 milliGRAM(s) Oral at bedtime  cholecalciferol 2000 Unit(s) Oral daily  dextrose 40% Gel 15 Gram(s) Oral once  dextrose 5%. 1000 milliLiter(s) (50 mL/Hr) IV Continuous <Continuous>  dextrose 5%. 1000 milliLiter(s) (100 mL/Hr) IV Continuous <Continuous>  dextrose 50% Injectable 25 Gram(s) IV Push once  dextrose 50% Injectable 12.5 Gram(s) IV Push once  dextrose 50% Injectable 25 Gram(s) IV Push once  glucagon  Injectable 1 milliGRAM(s) IntraMuscular once  insulin lispro (ADMELOG) corrective regimen sliding scale   SubCutaneous three times a day before meals  insulin lispro (ADMELOG) corrective regimen sliding scale   SubCutaneous at bedtime  levothyroxine 88 MICROGram(s) Oral daily  lisinopril 5 milliGRAM(s) Oral daily  metoprolol succinate ER 25 milliGRAM(s) Oral daily  rivaroxaban 15 milliGRAM(s) Oral with dinner    MEDICATIONS  (PRN):  acetaminophen   Tablet .. 650 milliGRAM(s) Oral every 6 hours PRN Moderate Pain (4 - 6)      LABS:                          12.5   5.61  )-----------( 153      ( 19 Mar 2021 06:58 )             39.0     Hemoglobin: 12.5 g/dL (03-19 @ 06:58)  Hemoglobin: 13.2 g/dL (03-18 @ 06:13)  Hemoglobin: 12.9 g/dL (03-17 @ 06:38)  Hemoglobin: 12.4 g/dL (03-16 @ 16:09)    03-19    137  |  106  |  35<H>  ----------------------------<  99  4.2   |  19<L>  |  1.59<H>    Ca    9.2      19 Mar 2021 06:58  Mg     2.1     03-19    TPro  6.5  /  Alb  3.6  /  TBili  0.7  /  DBili  x   /  AST  20  /  ALT  11  /  AlkPhos  28<L>  03-19    Creatinine Trend: 1.59<--, 1.11<--, 1.01<--, 1.14<--             03-18-21 @ 07:01  -  03-19-21 @ 07:00  --------------------------------------------------------  IN: 480 mL / OUT: 0 mL / NET: 480 mL    03-19-21 @ 07:01  -  03-19-21 @ 12:56  --------------------------------------------------------  IN: 240 mL / OUT: 0 mL / NET: 240 mL        PHYSICAL EXAM  Vital Signs Last 24 Hrs  T(C): 36.5 (19 Mar 2021 12:33), Max: 36.6 (18 Mar 2021 18:56)  T(F): 97.7 (19 Mar 2021 12:33), Max: 97.8 (18 Mar 2021 18:56)  HR: 76 (19 Mar 2021 12:33) (68 - 76)  BP: 107/69 (19 Mar 2021 12:33) (107/69 - 120/74)  BP(mean): --  RR: 18 (19 Mar 2021 12:33) (18 - 18)  SpO2: 98% (19 Mar 2021 12:33) (96% - 98%)      General: Well nourished in no acute distress. Alert and Oriented * 3.   Head: Normocephalic and atraumatic.   Neck: No JVD. No bruits. Supple. Does not appear to be enlarged.   Cardiovascular: + S1,S2 ; RRR Soft systolic murmur at the left lower sternal border. No rubs noted.    Lungs: CTA b/l. No rhonchi, rales or wheezes.   Abdomen: + BS, soft. Non tender. Non distended. No rebound. No guarding.   Extremities: No clubbing/cyanosis/edema.   Neurologic: Moves all four extremities. Full range of motion.   Skin: Warm and moist. The patient's skin has normal elasticity and good skin turgor.   Psychiatric: Appropriate mood and affect.  Musculoskeletal: Normal range of motion, normal strength    TELEMETRY: SR 60-70    < from: TTE with Doppler (w/Cont) (03.17.21 @ 14:22) >  Observations:  Mitral Valve: Normal mitral valve. Mitral annular  calcification.  Aortic Valve/Aorta: Fibrocalcific aortic valve without  stenosis.  .  Normal aortic root.  Left Atrium: Normal left atrium.  Left Ventricle: Endocardial visualization enhanced with  intravenous injection of Ultrasonic Enhancing Agent  (Definity).  Normal left ventricular internal dimensions and wall  thicknesses.  Normal left ventricular systolic function. No segmental  wall motion abnormalities.  Impaired LV-relaxation with normal filling pressure.  Right Heart: Normal right atrium. Normal right ventricular  size and function.  Normal tricuspid valve. Minimal tricuspid regurgitation.  Normal pulmonic valve.  Pericardium/Pleura: Normal pericardium with no pericardial  effusion.  Hemodynamic: Estimated right atrial pressue is normal.  No evidence of pulmonary hypertension.    < end of copied text >      ASSESSMENT/PLAN: 89 year old female with history of PAF on ac with NOAC, history of ILR, s/p MDT PPM for tachy-inocencia syndrome, HTN, DM, HLD, asthma who is being seen for palpitations.    -pt. currently symptom free  -pt. with indeterminate troponin with no anginal symptoms - do not suspect acs   -CTH negative for CVA  -Orthostatics negative  -PPM interrogation noted with no events  -EP eval appreciated  -TTE noted with normal LV function, no sig valvular abnormalities  -No further inpatient cardiac w/u planned - stable for discharge from CV perspective  -Patient to f/u with her cardiologist Dr. Diaz Goddard after d/c    SAMIR LimC  Pager: [phone]    
VASCULAR NEUROLOGY ATTENDING NOTE    Patient seen and examined history and imaging reviewed. Agree with resident/fellow note as applicable.    Overnight Events: None    VITALS:  Vital Signs Last 24 Hrs  T(C): 36.6 (17 Mar 2021 05:42), Max: 36.9 (16 Mar 2021 18:15)  T(F): 97.9 (17 Mar 2021 05:42), Max: 98.4 (16 Mar 2021 18:15)  HR: 76 (17 Mar 2021 05:42) (68 - 76)  BP: 152/83 (17 Mar 2021 05:42) (142/83 - 165/94)  BP(mean): --  RR: 18 (17 Mar 2021 05:42) (16 - 20)  SpO2: 96% (17 Mar 2021 05:42) (96% - 100%)    Labs:   03-17    138  |  104  |  18  ----------------------------<  89  4.1   |  21<L>  |  1.01    Ca    9.7      17 Mar 2021 06:38  Phos  3.3     03-16  Mg     2.0     03-16    TPro  7.0  /  Alb  4.0  /  TBili  0.7  /  DBili  x   /  AST  27  /  ALT  13  /  AlkPhos  27<L>  03-16                          12.9   6.10  )-----------( 151      ( 17 Mar 2021 06:38 )             41.2       MEDS:  atorvastatin 40 milliGRAM(s) Oral at bedtime  cholecalciferol 2000 Unit(s) Oral daily  dextrose 40% Gel 15 Gram(s) Oral once  dextrose 5%. 1000 milliLiter(s) IV Continuous <Continuous>  dextrose 5%. 1000 milliLiter(s) IV Continuous <Continuous>  dextrose 50% Injectable 25 Gram(s) IV Push once  dextrose 50% Injectable 12.5 Gram(s) IV Push once  dextrose 50% Injectable 25 Gram(s) IV Push once  glucagon  Injectable 1 milliGRAM(s) IntraMuscular once  insulin lispro (ADMELOG) corrective regimen sliding scale   SubCutaneous three times a day before meals  insulin lispro (ADMELOG) corrective regimen sliding scale   SubCutaneous at bedtime  levothyroxine 88 MICROGram(s) Oral daily  lisinopril 5 milliGRAM(s) Oral daily  metoprolol succinate ER 25 milliGRAM(s) Oral daily  rivaroxaban 15 milliGRAM(s) Oral with dinner      Exam:   MS: AAOx3, no aphasia  CNs:  PERRL, EOMI, VFF, face symmetric, tongue midline, no dysarthria  Motor:  no drift, 5/5 strength throughout, DELMA intact  Sensory:  intact sensation throughout, no extinction  Coord:  no dysmetria,   Gait: cautions steady one person assist rolling walker  
  Patient is a 89y old  Female who presents with a chief complaint of weakness (18 Mar 2021 15:08)      SUBJECTIVE / OVERNIGHT EVENTS: No ON events. Dizziness resolved, denies cp, sob, palpitations, n/v, emery. able to ambulate without issues    Tele reviewed: sinus 60-80      ADDITIONAL REVIEW OF SYSTEMS: Negative except for above    MEDICATIONS  (STANDING):  atorvastatin 40 milliGRAM(s) Oral at bedtime  cholecalciferol 2000 Unit(s) Oral daily  dextrose 40% Gel 15 Gram(s) Oral once  dextrose 5%. 1000 milliLiter(s) (50 mL/Hr) IV Continuous <Continuous>  dextrose 5%. 1000 milliLiter(s) (100 mL/Hr) IV Continuous <Continuous>  dextrose 50% Injectable 25 Gram(s) IV Push once  dextrose 50% Injectable 12.5 Gram(s) IV Push once  dextrose 50% Injectable 25 Gram(s) IV Push once  glucagon  Injectable 1 milliGRAM(s) IntraMuscular once  insulin lispro (ADMELOG) corrective regimen sliding scale   SubCutaneous three times a day before meals  insulin lispro (ADMELOG) corrective regimen sliding scale   SubCutaneous at bedtime  levothyroxine 88 MICROGram(s) Oral daily  lisinopril 5 milliGRAM(s) Oral daily  metoprolol succinate ER 25 milliGRAM(s) Oral daily  rivaroxaban 15 milliGRAM(s) Oral with dinner    MEDICATIONS  (PRN):      CAPILLARY BLOOD GLUCOSE      POCT Blood Glucose.: 101 mg/dL (18 Mar 2021 11:35)  POCT Blood Glucose.: 102 mg/dL (18 Mar 2021 07:41)  POCT Blood Glucose.: 113 mg/dL (17 Mar 2021 21:30)  POCT Blood Glucose.: 82 mg/dL (17 Mar 2021 17:02)    I&O's Summary    17 Mar 2021 07:01  -  18 Mar 2021 07:00  --------------------------------------------------------  IN: 280 mL / OUT: 0 mL / NET: 280 mL    18 Mar 2021 07:01  -  18 Mar 2021 15:15  --------------------------------------------------------  IN: 240 mL / OUT: 0 mL / NET: 240 mL        PHYSICAL EXAM:  Vital Signs Last 24 Hrs  T(C): 36.4 (18 Mar 2021 12:03), Max: 36.7 (18 Mar 2021 04:48)  T(F): 97.5 (18 Mar 2021 12:03), Max: 98.1 (18 Mar 2021 04:48)  HR: 80 (18 Mar 2021 12:03) (79 - 92)  BP: 107/71 (18 Mar 2021 12:03) (107/71 - 147/79)  BP(mean): --  RR: 18 (18 Mar 2021 12:03) (18 - 18)  SpO2: 96% (18 Mar 2021 12:03) (96% - 99%)    PHYSICAL EXAM:  GENERAL: NAD, well-developed in chair  HEAD:  Atraumatic, Normocephalic  EYES:  conjunctiva and sclera clear  NECK: Supple, No JVD  CHEST/LUNG: Clear to auscultation bilaterally; No wheeze  HEART: Regular rate and rhythm;   ABDOMEN: Soft, Nontender, Nondistended; Bowel sounds present  EXTREMITIES:  2+ Peripheral Pulses, No clubbing, cyanosis, or edema  PSYCH: AAOx3  NEUROLOGY: non-focal  SKIN: No rashes or lesions      LABS:                        13.2   5.85  )-----------( 159      ( 18 Mar 2021 06:13 )             41.7     03-18    139  |  105  |  21  ----------------------------<  97  3.9   |  22  |  1.11    Ca    9.6      18 Mar 2021 06:13  Phos  3.3     03-16  Mg     2.0     03-18    TPro  6.5  /  Alb  3.6  /  TBili  1.0  /  DBili  x   /  AST  16  /  ALT  12  /  AlkPhos  28<L>  03-18    PT/INR - ( 16 Mar 2021 16:09 )   PT: 13.3 sec;   INR: 1.11 ratio         PTT - ( 16 Mar 2021 16:09 )  PTT:32.4 sec      Urinalysis Basic - ( 16 Mar 2021 17:44 )    Color: Colorless / Appearance: Clear / S.016 / pH: x  Gluc: x / Ketone: Negative  / Bili: Negative / Urobili: Negative   Blood: x / Protein: Negative / Nitrite: Negative   Leuk Esterase: Negative / RBC: x / WBC x   Sq Epi: x / Non Sq Epi: x / Bacteria: x        Culture - Urine (collected 16 Mar 2021 22:16)  Source: .Urine Clean Catch (Midstream)  Final Report (17 Mar 2021 22:26):    <10,000 CFU/mL Normal Urogenital Sarah        RADIOLOGY & ADDITIONAL TESTS:    Imaging Personally Reviewed: TTE: Conclusions:  Endocardial visualization enhanced with intravenous  injection of Ultrasonic Enhancing Agent (Definity).  Normal left ventricular systolic function. No segmental  wall motion abnormalities.    Electrocardiogram Personally Reviewed:    COORDINATION OF CARE:  Care Discussed with Consultants/Other Providers [Y/N]:  Prior or Outpatient Records Reviewed [Y/N]:  
  Patient is a 89y old  Female who presents with a chief complaint of weakness (17 Mar 2021 13:18)      SUBJECTIVE / OVERNIGHT EVENTS: No ON events. Reports feeling dizzy when she sits up. Denies cp, sob, palpitations, n/v, bleeding,     Tele reviewed: sinus      ADDITIONAL REVIEW OF SYSTEMS: Negative except for above    MEDICATIONS  (STANDING):  atorvastatin 40 milliGRAM(s) Oral at bedtime  cholecalciferol 2000 Unit(s) Oral daily  dextrose 40% Gel 15 Gram(s) Oral once  dextrose 5%. 1000 milliLiter(s) (50 mL/Hr) IV Continuous <Continuous>  dextrose 5%. 1000 milliLiter(s) (100 mL/Hr) IV Continuous <Continuous>  dextrose 50% Injectable 25 Gram(s) IV Push once  dextrose 50% Injectable 12.5 Gram(s) IV Push once  dextrose 50% Injectable 25 Gram(s) IV Push once  glucagon  Injectable 1 milliGRAM(s) IntraMuscular once  insulin lispro (ADMELOG) corrective regimen sliding scale   SubCutaneous three times a day before meals  insulin lispro (ADMELOG) corrective regimen sliding scale   SubCutaneous at bedtime  levothyroxine 88 MICROGram(s) Oral daily  lisinopril 5 milliGRAM(s) Oral daily  metoprolol succinate ER 25 milliGRAM(s) Oral daily  rivaroxaban 15 milliGRAM(s) Oral with dinner    MEDICATIONS  (PRN):      CAPILLARY BLOOD GLUCOSE      POCT Blood Glucose.: 97 mg/dL (17 Mar 2021 12:21)  POCT Blood Glucose.: 93 mg/dL (17 Mar 2021 09:11)  POCT Blood Glucose.: 95 mg/dL (16 Mar 2021 23:32)    I&O's Summary      PHYSICAL EXAM:  Vital Signs Last 24 Hrs  T(C): 36.6 (17 Mar 2021 05:42), Max: 36.9 (16 Mar 2021 18:15)  T(F): 97.9 (17 Mar 2021 05:42), Max: 98.4 (16 Mar 2021 18:15)  HR: 88 (17 Mar 2021 12:11) (68 - 88)  BP: 133/85 (17 Mar 2021 12:11) (133/85 - 157/90)  BP(mean): --  RR: 18 (17 Mar 2021 12:11) (16 - 20)  SpO2: 96% (17 Mar 2021 12:11) (96% - 100%)    PHYSICAL EXAM:  GENERAL: NAD, well-developed  HEAD:  Atraumatic, Normocephalic  NECK: Supple, No JVD  CHEST/LUNG: Clear to auscultation bilaterally; No wheeze  HEART: Regular rate and rhythm  ABDOMEN: Soft, Nontender, Nondistended; Bowel sounds present  EXTREMITIES:  2+ Peripheral Pulses, No clubbing, cyanosis, or edema, no calf tenderness  PSYCH: AAOx3  NEUROLOGY: non-focal  SKIN: No rashes or lesions      LABS:                        12.9   6.10  )-----------( 151      ( 17 Mar 2021 06:38 )             41.2     03-17    138  |  104  |  18  ----------------------------<  89  4.1   |  21<L>  |  1.01    Ca    9.7      17 Mar 2021 06:38  Phos  3.3     03-16  Mg     2.0     03-16    TPro  7.0  /  Alb  4.0  /  TBili  0.7  /  DBili  x   /  AST  27  /  ALT  13  /  AlkPhos  27<L>  03-16    PT/INR - ( 16 Mar 2021 16:09 )   PT: 13.3 sec;   INR: 1.11 ratio         PTT - ( 16 Mar 2021 16:09 )  PTT:32.4 sec      Urinalysis Basic - ( 16 Mar 2021 17:44 )    Color: Colorless / Appearance: Clear / S.016 / pH: x  Gluc: x / Ketone: Negative  / Bili: Negative / Urobili: Negative   Blood: x / Protein: Negative / Nitrite: Negative   Leuk Esterase: Negative / RBC: x / WBC x   Sq Epi: x / Non Sq Epi: x / Bacteria: x          RADIOLOGY & ADDITIONAL TESTS:    Imaging Personally Reviewed: CT h/n:   IMPRESSION:    CT brain: No acute intracranial hemorrhage, mass effect, midline shift or acute cerebral cortical infarct.    CT angiography neck: Less than 50% bilaterally ICA stenosis by NASCET criteria. No vascular dissection. 1.0 x 0.8 cm right thyroid lobe nodule. Recommend nonemergent thyroid ultrasound as clinically warranted    CT angiography brain: No major vessel occlusion or proximal stenosis. No evidence of aneurysm or other vascular malformation.    Electrocardiogram Personally Reviewed:    COORDINATION OF CARE:  Care Discussed with Consultants/Other Providers [Y/N]:  Prior or Outpatient Records Reviewed [Y/N]:  
  Patient is a 89y old  Female who presents with a chief complaint of weakness (19 Mar 2021 12:56)      SUBJECTIVE / OVERNIGHT EVENTS: No ON events. feels well, denies chest pain, sob, dizziness, palpitaiotns, ready to go home    Tele reviewed: sinus 60 -90      ADDITIONAL REVIEW OF SYSTEMS: Negative except for above    MEDICATIONS  (STANDING):  atorvastatin 40 milliGRAM(s) Oral at bedtime  cholecalciferol 2000 Unit(s) Oral daily  dextrose 40% Gel 15 Gram(s) Oral once  dextrose 5%. 1000 milliLiter(s) (50 mL/Hr) IV Continuous <Continuous>  dextrose 5%. 1000 milliLiter(s) (100 mL/Hr) IV Continuous <Continuous>  dextrose 50% Injectable 25 Gram(s) IV Push once  dextrose 50% Injectable 12.5 Gram(s) IV Push once  dextrose 50% Injectable 25 Gram(s) IV Push once  glucagon  Injectable 1 milliGRAM(s) IntraMuscular once  insulin lispro (ADMELOG) corrective regimen sliding scale   SubCutaneous three times a day before meals  insulin lispro (ADMELOG) corrective regimen sliding scale   SubCutaneous at bedtime  levothyroxine 88 MICROGram(s) Oral daily  lisinopril 5 milliGRAM(s) Oral daily  metoprolol succinate ER 25 milliGRAM(s) Oral daily  rivaroxaban 15 milliGRAM(s) Oral with dinner    MEDICATIONS  (PRN):  acetaminophen   Tablet .. 650 milliGRAM(s) Oral every 6 hours PRN Moderate Pain (4 - 6)      CAPILLARY BLOOD GLUCOSE      POCT Blood Glucose.: 91 mg/dL (19 Mar 2021 11:38)  POCT Blood Glucose.: 112 mg/dL (19 Mar 2021 07:36)  POCT Blood Glucose.: 127 mg/dL (18 Mar 2021 21:11)  POCT Blood Glucose.: 92 mg/dL (18 Mar 2021 16:45)    I&O's Summary    18 Mar 2021 07:01  -  19 Mar 2021 07:00  --------------------------------------------------------  IN: 480 mL / OUT: 0 mL / NET: 480 mL    19 Mar 2021 07:01  -  19 Mar 2021 13:19  --------------------------------------------------------  IN: 240 mL / OUT: 0 mL / NET: 240 mL        PHYSICAL EXAM:  Vital Signs Last 24 Hrs  T(C): 36.5 (19 Mar 2021 12:33), Max: 36.6 (18 Mar 2021 18:56)  T(F): 97.7 (19 Mar 2021 12:33), Max: 97.8 (18 Mar 2021 18:56)  HR: 76 (19 Mar 2021 12:33) (68 - 76)  BP: 107/69 (19 Mar 2021 12:33) (107/69 - 120/74)  BP(mean): --  RR: 18 (19 Mar 2021 12:33) (18 - 18)  SpO2: 98% (19 Mar 2021 12:33) (96% - 98%)    PHYSICAL EXAM:  GENERAL: NAD, well-developed in chair  HEAD:  Atraumatic, Normocephalic  NECK: Supple, No JVD  CHEST/LUNG: Clear to auscultation bilaterally; No wheeze  HEART: Regular rate and rhythm;  ABDOMEN: Soft, Nontender, Nondistended; Bowel sounds present  EXTREMITIES:  2+ Peripheral Pulses, No clubbing, cyanosis, or edema  PSYCH: AAOx3  NEUROLOGY: non-focal  SKIN: No rashes or lesions      LABS:                        12.5   5.61  )-----------( 153      ( 19 Mar 2021 06:58 )             39.0     03-19    137  |  106  |  35<H>  ----------------------------<  99  4.2   |  19<L>  |  1.59<H>    Ca    9.2      19 Mar 2021 06:58  Mg     2.1     03-19    TPro  6.5  /  Alb  3.6  /  TBili  0.7  /  DBili  x   /  AST  20  /  ALT  11  /  AlkPhos  28<L>  03-19              Culture - Urine (collected 16 Mar 2021 22:16)  Source: .Urine Clean Catch (Midstream)  Final Report (17 Mar 2021 22:26):    <10,000 CFU/mL Normal Urogenital Sarah        RADIOLOGY & ADDITIONAL TESTS:    Imaging Personally Reviewed:    Electrocardiogram Personally Reviewed:    COORDINATION OF CARE:  Care Discussed with Consultants/Other Providers [Y/N]:  Prior or Outpatient Records Reviewed [Y/N]:

## 2021-03-19 NOTE — PROGRESS NOTE ADULT - PROBLEM SELECTOR PLAN 7
- c/w home levothyroxine   -  tsh elevated but f4 wnl  #Thyroid nodule   -1.0 x 0.8 cm right thyroid lobe nodule incidentally found on CT imaging.   -OP thyroid ultrasound.
- c/w home levothyroxine   - f/u TSH   #Thyroid nodule   -1.0 x 0.8 cm right thyroid lobe nodule incidentally found on CT imaging.   -OP thyroid ultrasound.
- c/w home levothyroxine   -  tsh elevated but f4 wnl  #Thyroid nodule   -1.0 x 0.8 cm right thyroid lobe nodule incidentally found on CT imaging.   -OP thyroid ultrasound.

## 2021-03-19 NOTE — PROGRESS NOTE ADULT - ASSESSMENT
88 yo F with a pmhx of DM, HTN, hyperlipidemia, asthma, afib on eliquis, ILR tachy-inocencia syndrome s/p PPM, hypothyroidism presenting with complaints of weakness and dizziness for the past week. Found to have multiple runs of SVT on outpatient cardiologist device interrogation. 
89 yo F with a pmhx of DM, HTN, hyperlipidemia, asthma, afib on eliquis, ILR tachy-inocencia syndrome s/p PPM, hypothyroidism presenting with complaints of weakness and dizziness for the past week. Found to have multiple runs of SVT on outpatient cardiologist device interrogation. 
88 yo F with HTN, DM, HLD, Asthma, Afib on eliquis, ILR tachy-inocencia syndrop s/p PPM, hypothyoridism, p/w dizziness found to have SVT at outpt cardio.  Dizziness improved. Gait disturbance multifactorial, cardiogenic, SS, OA and pieriphal vertigo  - benefits of AC outweighs potential fall risk. on xarelto  - outpt vestubilar therapy  - telemetry  - PT/OT, OOBC  - check FS, glucose control <180  - GI/DVT ppx  - Counseling on diet, exercise, and medication adherence was done  - Counseling on smoking cessation and alcohol consumption offered when appropriate.  - Pain assessed and judicious use of narcotics when appropriate was discussed.    - Stroke education given when appropriate.  - Importance of fall prevention discussed.   - Differential diagnosis and plan of care discussed with patient and/or family and primary team  - Thank you for allowing me to participate in the care of this patient. Call with questions.   - d/c plan   Paulino Reveles MD  Vascular Neurology  Office: [phone]  
89 yo F with a pmhx of DM, HTN, hyperlipidemia, asthma, afib on eliquis, ILR tachy-inocencia syndrome s/p PPM, hypothyroidism presenting with complaints of weakness and dizziness for the past week. Found to have multiple runs of SVT on outpatient cardiologist device interrogation.

## 2021-03-19 NOTE — PROGRESS NOTE ADULT - PROBLEM SELECTOR PLAN 2
-SVT noted on OP cardiology interrogation  - c/w telemetry monitoring  - cardiology and EP rec TTE and reintegrate pacemaker here, will call house EP  - tele
no SVT noted on integration  - no tele events  - plan as above
no SVT noted on integration  - no tele events  - plan as above

## 2021-03-19 NOTE — PROGRESS NOTE ADULT - PROBLEM SELECTOR PLAN 9
- on rosuvastatin at home.  - c/w atorvastatin 40 QHS via therapeutic interchange

## 2021-03-19 NOTE — PROGRESS NOTE ADULT - PROBLEM SELECTOR PLAN 4
- CHADSVASC: 5 (Age, sex, HTN, diabetes)  - c/w xarelto 15mg R>B  - c/w toprol 25mg ER daily
- CHADSVASC: 5 (Age, sex, HTN, diabetes)  - c/w xarelto 15mg  - c/w toprol 25mg ER daily
- CHADSVASC: 5 (Age, sex, HTN, diabetes)  - c/w xarelto 15mg R>B  - c/w toprol 25mg ER daily

## 2022-04-20 NOTE — PHYSICAL THERAPY INITIAL EVALUATION ADULT - MANUAL MUSCLE TESTING RESULTS, REHAB EVAL
sensation to LT+ x 4 ext.
BLE/BUE: grossly assessed at least equal to or greater than 3/5/grossly assessed due to

## 2023-01-04 ENCOUNTER — INPATIENT (INPATIENT)
Facility: HOSPITAL | Age: 88
LOS: 1 days | Discharge: HOME | DRG: 445 | End: 2023-01-06
Attending: SURGERY | Admitting: SURGERY
Payer: MEDICARE

## 2023-01-04 VITALS
WEIGHT: 164.91 LBS | DIASTOLIC BLOOD PRESSURE: 82 MMHG | RESPIRATION RATE: 20 BRPM | OXYGEN SATURATION: 97 % | HEIGHT: 66 IN | HEART RATE: 79 BPM | SYSTOLIC BLOOD PRESSURE: 134 MMHG | TEMPERATURE: 98 F

## 2023-01-04 DIAGNOSIS — R79.89 OTHER SPECIFIED ABNORMAL FINDINGS OF BLOOD CHEMISTRY: ICD-10-CM

## 2023-01-04 LAB
ALBUMIN SERPL ELPH-MCNC: 3.7 G/DL — SIGNIFICANT CHANGE UP (ref 3.3–5)
ALP SERPL-CCNC: 129 U/L — HIGH (ref 40–120)
ALT FLD-CCNC: 305 U/L — HIGH (ref 10–45)
ANION GAP SERPL CALC-SCNC: 12 MMOL/L — SIGNIFICANT CHANGE UP (ref 5–17)
APTT BLD: 26.3 SEC — LOW (ref 27.5–35.5)
AST SERPL-CCNC: 150 U/L — HIGH (ref 10–40)
BASE EXCESS BLDV CALC-SCNC: -3.7 MMOL/L — LOW (ref -2–3)
BASOPHILS # BLD AUTO: 0.04 K/UL — SIGNIFICANT CHANGE UP (ref 0–0.2)
BASOPHILS NFR BLD AUTO: 0.6 % — SIGNIFICANT CHANGE UP (ref 0–2)
BILIRUB DIRECT SERPL-MCNC: 0.2 MG/DL — SIGNIFICANT CHANGE UP (ref 0–0.3)
BILIRUB INDIRECT FLD-MCNC: 1.1 MG/DL — HIGH (ref 0.2–1)
BILIRUB SERPL-MCNC: 1.3 MG/DL — HIGH (ref 0.2–1.2)
BILIRUB SERPL-MCNC: 1.3 MG/DL — HIGH (ref 0.2–1.2)
BUN SERPL-MCNC: 36 MG/DL — HIGH (ref 7–23)
CA-I SERPL-SCNC: 1.22 MMOL/L — SIGNIFICANT CHANGE UP (ref 1.15–1.33)
CALCIUM SERPL-MCNC: 9.2 MG/DL — SIGNIFICANT CHANGE UP (ref 8.4–10.5)
CHLORIDE BLDV-SCNC: 104 MMOL/L — SIGNIFICANT CHANGE UP (ref 96–108)
CHLORIDE SERPL-SCNC: 106 MMOL/L — SIGNIFICANT CHANGE UP (ref 96–108)
CO2 BLDV-SCNC: 23 MMOL/L — SIGNIFICANT CHANGE UP (ref 22–26)
CO2 SERPL-SCNC: 19 MMOL/L — LOW (ref 22–31)
CREAT SERPL-MCNC: 1.69 MG/DL — HIGH (ref 0.5–1.3)
EGFR: 28 ML/MIN/1.73M2 — LOW
EOSINOPHIL # BLD AUTO: 0.16 K/UL — SIGNIFICANT CHANGE UP (ref 0–0.5)
EOSINOPHIL NFR BLD AUTO: 2.4 % — SIGNIFICANT CHANGE UP (ref 0–6)
FLUAV AG NPH QL: SIGNIFICANT CHANGE UP
FLUBV AG NPH QL: SIGNIFICANT CHANGE UP
GAS PNL BLDV: 135 MMOL/L — LOW (ref 136–145)
GAS PNL BLDV: SIGNIFICANT CHANGE UP
GLUCOSE BLDC GLUCOMTR-MCNC: 92 MG/DL — SIGNIFICANT CHANGE UP (ref 70–99)
GLUCOSE BLDC GLUCOMTR-MCNC: 97 MG/DL — SIGNIFICANT CHANGE UP (ref 70–99)
GLUCOSE BLDV-MCNC: 98 MG/DL — SIGNIFICANT CHANGE UP (ref 70–99)
GLUCOSE SERPL-MCNC: 96 MG/DL — SIGNIFICANT CHANGE UP (ref 70–99)
HCO3 BLDV-SCNC: 22 MMOL/L — SIGNIFICANT CHANGE UP (ref 22–29)
HCT VFR BLD CALC: 37.7 % — SIGNIFICANT CHANGE UP (ref 34.5–45)
HCT VFR BLDA CALC: 35 % — SIGNIFICANT CHANGE UP (ref 34.5–46.5)
HGB BLD CALC-MCNC: 11.7 G/DL — SIGNIFICANT CHANGE UP (ref 11.7–16.1)
HGB BLD-MCNC: 11.6 G/DL — SIGNIFICANT CHANGE UP (ref 11.5–15.5)
IMM GRANULOCYTES NFR BLD AUTO: 0.6 % — SIGNIFICANT CHANGE UP (ref 0–0.9)
INR BLD: 1.48 RATIO — HIGH (ref 0.88–1.16)
LACTATE BLDV-MCNC: 1.3 MMOL/L — SIGNIFICANT CHANGE UP (ref 0.5–2)
LIDOCAIN IGE QN: 25 U/L — SIGNIFICANT CHANGE UP (ref 7–60)
LYMPHOCYTES # BLD AUTO: 0.4 K/UL — LOW (ref 1–3.3)
LYMPHOCYTES # BLD AUTO: 6.1 % — LOW (ref 13–44)
MCHC RBC-ENTMCNC: 24.6 PG — LOW (ref 27–34)
MCHC RBC-ENTMCNC: 30.8 GM/DL — LOW (ref 32–36)
MCV RBC AUTO: 80 FL — SIGNIFICANT CHANGE UP (ref 80–100)
MONOCYTES # BLD AUTO: 0.83 K/UL — SIGNIFICANT CHANGE UP (ref 0–0.9)
MONOCYTES NFR BLD AUTO: 12.6 % — SIGNIFICANT CHANGE UP (ref 2–14)
NEUTROPHILS # BLD AUTO: 5.13 K/UL — SIGNIFICANT CHANGE UP (ref 1.8–7.4)
NEUTROPHILS NFR BLD AUTO: 77.7 % — HIGH (ref 43–77)
NRBC # BLD: 0 /100 WBCS — SIGNIFICANT CHANGE UP (ref 0–0)
PCO2 BLDV: 42 MMHG — SIGNIFICANT CHANGE UP (ref 39–42)
PH BLDV: 7.33 — SIGNIFICANT CHANGE UP (ref 7.32–7.43)
PLATELET # BLD AUTO: 141 K/UL — LOW (ref 150–400)
PO2 BLDV: 26 MMHG — SIGNIFICANT CHANGE UP (ref 25–45)
POTASSIUM BLDV-SCNC: 5.6 MMOL/L — HIGH (ref 3.5–5.1)
POTASSIUM SERPL-MCNC: 5.5 MMOL/L — HIGH (ref 3.5–5.3)
POTASSIUM SERPL-SCNC: 5.5 MMOL/L — HIGH (ref 3.5–5.3)
PROT SERPL-MCNC: 7.1 G/DL — SIGNIFICANT CHANGE UP (ref 6–8.3)
PROTHROM AB SERPL-ACNC: 17.1 SEC — HIGH (ref 10.5–13.4)
RBC # BLD: 4.71 M/UL — SIGNIFICANT CHANGE UP (ref 3.8–5.2)
RBC # FLD: 14.6 % — HIGH (ref 10.3–14.5)
RSV RNA NPH QL NAA+NON-PROBE: SIGNIFICANT CHANGE UP
SAO2 % BLDV: 36.2 % — LOW (ref 67–88)
SARS-COV-2 RNA SPEC QL NAA+PROBE: SIGNIFICANT CHANGE UP
SODIUM SERPL-SCNC: 137 MMOL/L — SIGNIFICANT CHANGE UP (ref 135–145)
WBC # BLD: 6.6 K/UL — SIGNIFICANT CHANGE UP (ref 3.8–10.5)
WBC # FLD AUTO: 6.6 K/UL — SIGNIFICANT CHANGE UP (ref 3.8–10.5)

## 2023-01-04 PROCEDURE — 99285 EMERGENCY DEPT VISIT HI MDM: CPT | Mod: FS

## 2023-01-04 PROCEDURE — 76705 ECHO EXAM OF ABDOMEN: CPT | Mod: 26

## 2023-01-04 RX ORDER — PIPERACILLIN AND TAZOBACTAM 4; .5 G/20ML; G/20ML
3.38 INJECTION, POWDER, LYOPHILIZED, FOR SOLUTION INTRAVENOUS ONCE
Refills: 0 | Status: COMPLETED | OUTPATIENT
Start: 2023-01-05 | End: 2023-01-05

## 2023-01-04 RX ORDER — DEXTROSE 50 % IN WATER 50 %
25 SYRINGE (ML) INTRAVENOUS ONCE
Refills: 0 | Status: DISCONTINUED | OUTPATIENT
Start: 2023-01-04 | End: 2023-01-06

## 2023-01-04 RX ORDER — LEVOTHYROXINE SODIUM 125 MCG
1 TABLET ORAL
Qty: 0 | Refills: 0 | DISCHARGE

## 2023-01-04 RX ORDER — SODIUM CHLORIDE 9 MG/ML
1000 INJECTION, SOLUTION INTRAVENOUS
Refills: 0 | Status: DISCONTINUED | OUTPATIENT
Start: 2023-01-04 | End: 2023-01-06

## 2023-01-04 RX ORDER — PIPERACILLIN AND TAZOBACTAM 4; .5 G/20ML; G/20ML
3.38 INJECTION, POWDER, LYOPHILIZED, FOR SOLUTION INTRAVENOUS ONCE
Refills: 0 | Status: COMPLETED | OUTPATIENT
Start: 2023-01-04 | End: 2023-01-04

## 2023-01-04 RX ORDER — METOPROLOL TARTRATE 50 MG
5 TABLET ORAL EVERY 6 HOURS
Refills: 0 | Status: DISCONTINUED | OUTPATIENT
Start: 2023-01-04 | End: 2023-01-06

## 2023-01-04 RX ORDER — LEVOTHYROXINE SODIUM 125 MCG
90 TABLET ORAL AT BEDTIME
Refills: 0 | Status: DISCONTINUED | OUTPATIENT
Start: 2023-01-04 | End: 2023-01-06

## 2023-01-04 RX ORDER — ENOXAPARIN SODIUM 100 MG/ML
30 INJECTION SUBCUTANEOUS EVERY 24 HOURS
Refills: 0 | Status: DISCONTINUED | OUTPATIENT
Start: 2023-01-04 | End: 2023-01-06

## 2023-01-04 RX ORDER — GLUCAGON INJECTION, SOLUTION 0.5 MG/.1ML
1 INJECTION, SOLUTION SUBCUTANEOUS ONCE
Refills: 0 | Status: DISCONTINUED | OUTPATIENT
Start: 2023-01-04 | End: 2023-01-06

## 2023-01-04 RX ORDER — RALOXIFENE HYDROCHLORIDE 60 MG/1
1 TABLET, COATED ORAL
Qty: 0 | Refills: 0 | DISCHARGE

## 2023-01-04 RX ORDER — CHOLECALCIFEROL (VITAMIN D3) 125 MCG
1 CAPSULE ORAL
Qty: 0 | Refills: 0 | DISCHARGE

## 2023-01-04 RX ORDER — LISINOPRIL 2.5 MG/1
1 TABLET ORAL
Qty: 0 | Refills: 0 | DISCHARGE

## 2023-01-04 RX ORDER — DEXTROSE 50 % IN WATER 50 %
15 SYRINGE (ML) INTRAVENOUS ONCE
Refills: 0 | Status: DISCONTINUED | OUTPATIENT
Start: 2023-01-04 | End: 2023-01-06

## 2023-01-04 RX ORDER — PIPERACILLIN AND TAZOBACTAM 4; .5 G/20ML; G/20ML
3.38 INJECTION, POWDER, LYOPHILIZED, FOR SOLUTION INTRAVENOUS EVERY 8 HOURS
Refills: 0 | Status: DISCONTINUED | OUTPATIENT
Start: 2023-01-05 | End: 2023-01-06

## 2023-01-04 RX ORDER — SODIUM CHLORIDE 9 MG/ML
1000 INJECTION, SOLUTION INTRAVENOUS
Refills: 0 | Status: DISCONTINUED | OUTPATIENT
Start: 2023-01-04 | End: 2023-01-05

## 2023-01-04 RX ORDER — DEXTROSE 50 % IN WATER 50 %
12.5 SYRINGE (ML) INTRAVENOUS ONCE
Refills: 0 | Status: DISCONTINUED | OUTPATIENT
Start: 2023-01-04 | End: 2023-01-06

## 2023-01-04 RX ORDER — INSULIN LISPRO 100/ML
VIAL (ML) SUBCUTANEOUS EVERY 6 HOURS
Refills: 0 | Status: DISCONTINUED | OUTPATIENT
Start: 2023-01-04 | End: 2023-01-05

## 2023-01-04 RX ADMIN — PIPERACILLIN AND TAZOBACTAM 200 GRAM(S): 4; .5 INJECTION, POWDER, LYOPHILIZED, FOR SOLUTION INTRAVENOUS at 22:08

## 2023-01-04 RX ADMIN — Medication 90 MICROGRAM(S): at 22:16

## 2023-01-04 RX ADMIN — ENOXAPARIN SODIUM 30 MILLIGRAM(S): 100 INJECTION SUBCUTANEOUS at 22:08

## 2023-01-04 RX ADMIN — SODIUM CHLORIDE 120 MILLILITER(S): 9 INJECTION, SOLUTION INTRAVENOUS at 20:53

## 2023-01-04 RX ADMIN — Medication 5 MILLIGRAM(S): at 23:48

## 2023-01-04 NOTE — ED ADULT NURSE NOTE - ED STAT RN HANDOFF DETAILS
No notes on file       SUBJECTIVE:         Chief Complaint   Patient presents with   • Abdominal Pain   • Ear Pain         Patient is a 9 year old female who presents with complains of  GI issues that has been present for yesterday     Symptoms started  with  Left ear pain .    Today with abdominal pain in morning.    Better now.  No nausea/vomiting/diarrhea/constipation.  .   FEVER:  none.            ROS:   URI symptoms: none    EYE Problems: none.  EAR Problems: no ear pain.  Lungs: no breathing issues or wheezing.  Heart:  No chest pain.  Abdomen: No abdominal pain or vomiting or diarrhea.   no urination issues  SKIN:  No rashes.           PMH:   AR      ALLERGIES:    ALLERGIES:  Patient has no known allergies.      MEDICATION:  Current Outpatient Medications   Medication Sig Dispense Refill   • loratadine (CLARITIN) 10 MG tablet Take 10 mg by mouth daily.     • DISPENSE Compound: cantharidin 0.7% liquid; Sig: To be applied in MD office (deliver to MD office) 1 Bottle 0     No current facility-administered medications for this visit.           Social History:  Social History     Tobacco Use   • Smoking status: Never Smoker   • Smokeless tobacco: Never Used   Substance Use Topics   • Alcohol use: Not on file   • Drug use: Not on file           OBJECTIVE:    Vitals:    09/29/21 1444   Pulse: 82   Resp: 20   Temp: 97.2 °F (36.2 °C)   SpO2: 98%   Weight: 44.9 kg (99 lb)         General appearance: Alert and in no apparent distress.    Right Ear Exam: External auditory canal without erythema, swelling, or discharge., TM's intact without erythema, bulging, retraction, or fluid level.  Left Ear Exam:  External auditory canal without erythema, swelling, or discharge., TM's  Red/dull     Eyes:   Clear without injection or discharge  Throat: pink and moist without edema, erythema or exudates or signs of abscess.      Neck: supple without cervical lymphadenopathy.  No meningismus.    Lungs: clear to auscultation  without wheezes, rhonchi, rales, retractions or stridors.  Heart:  Regular rate and rhythm.  Extremities: unremarkable  Skin: no rash  Abd: Notable bowel sounds.  Soft.  Nondistended.  Some discomfort with palpation epigastric but no rebounding guarding or peritoneal signs.      Tests:     Results for orders placed or performed in visit on 09/29/21   POCT SARS-COV-2 ANTIGEN   Result Value Ref Range    POCT SARS-COV-2 ANTIGEN Not Detected Not Detected        COVID-19 rapid test NEGATIVE. Results were independently reviewed and interpreted and discussed during patient evaluation.   Independent Historian:  parent             ASSESSMENT/PLAN:       [x]  1 Undiagnosed New Problem Uncertain Prognosis  [MOD]:    • Left OM  • Abdominal pain.       PLAN:      Orders Placed This Encounter   • POCT SARS-COV-2 ANTIGEN   • amoxicillin (AMOXIL) 400 MG/5ML suspension       Medications prescribed. Discussed with patient the side effects of medication prescribed and precaution to take.    .     see AVS for details of plan/treatment.       follow brat diet.  Increase fluids    Take your medication  Recommend probiotics when taking antibiotics (( Lactobacillus  ---Florastor or Bio K)  May take tylenol if needed  Symptoms should improve gradually over the next 3-5 days.      If symptoms  persist or  worsen then the patient will need to reassessed.  If the patient cannot be seen by their PCP, then the patient is recommended to return to Immediate Care or to seek care in Emergency Room if Immediate Care is not available.         Patient was given the AVS summary after it was was reviewed with patient.  Patient voiced understanding and was in an agreement with plan. Patient was   encouraged to contact our office with any questions or issues regarding treatment and care.        Paris Munoz MD'     hand off given to oncoming RN Kieran Moreira

## 2023-01-04 NOTE — ED ADULT NURSE NOTE - OBJECTIVE STATEMENT
1300 89 yr old WF sent to Er for further eval and tx of elevated liver enzymes. Hx through daughter. Pt A&Ox4. skin jaundiced. Fever 2 days ago and dark urine and mildly confused at that time per daughter. A&Ox4. Denies pain, dizziness or recent falls. Fall risk precautions maintained

## 2023-01-04 NOTE — H&P ADULT - HISTORY OF PRESENT ILLNESS
89yo F h/o HTN, hypothyroid, Afib (on Xarelto), PPM (for tachy-inocencia syndrome) presenting to ER for elevated LFTs. Pt was having an outpt workup last week and found to have gallstones. Pt then got lab work in anticipation of seeing her cardiologist for a separate issue (increased dyspnea on exertion) - her lab work was significant for elevated LFTs, so her cardiologist sent her in to the ER.

## 2023-01-04 NOTE — H&P ADULT - ASSESSMENT
89yo F h/o HTN, hypothyroid, Afib (on Xarelto), PPM (for tachy-inocencia syndrome) presenting to ER for elevated LFTs, found to have likely choledocholithiasis.    Impression/Plan  - Admit to surgery Dr. Alonso  - Choledocholithiasis: GI consulted, MRCP to assess for choledocholithiasis (TBili 3 on outpatient labs, 1.3 on admission - likely passing/passed stone)  - MATHESU: Cr elevated to 1.69 (1-1.1 on previous admissions); IV hydration  - Pt afebrile and stable, no WBC - no abx for now    Green Team Surgery  Please page for all questions [phone], not available on Microsoft Teams

## 2023-01-04 NOTE — ED CLERICAL - NS ED CLERK NOTE PRE-ARRIVAL INFORMATION; ADDITIONAL PRE-ARRIVAL INFORMATION
CC/Reason For referral: acute cholecystitis, increased LFTs  Preferred Consultant(if applicable):  Celeste  Who admits for you (if needed): na  Do you have documents you would like to fax over? no  Would you still like to speak to an ED attending? yes

## 2023-01-04 NOTE — ED ADULT NURSE NOTE - NS ED NURSE LEVEL OF CONSCIOUSNESS ORIENTATION
Pulmonary Function Test  Performed by: Shanti Flores MD  Authorized by: Robbi Lagunas Oriented - self; Oriented - place; Oriented - time

## 2023-01-04 NOTE — ED ADULT NURSE REASSESSMENT NOTE - NS ED NURSE REASSESS COMMENT FT1
Patient resting comfortably, breathing unlabored, VSS, no signs of acute distress, side rails raised, call bell within reach, comfort and safety maintained.

## 2023-01-04 NOTE — H&P ADULT - NSHPLABSRESULTS_GEN_ALL_CORE
LABS:                         11.6   6.60  )-----------( 141      ( 04 Jan 2023 14:41 )             37.7     01-04    137  |  106  |  36<H>  ----------------------------<  96  5.5<H>   |  19<L>  |  1.69<H>    Ca    9.2      04 Jan 2023 14:41    TPro  7.1  /  Alb  3.7  /  TBili  1.3<H>  /  DBili  0.2  /  AST  150<H>  /  ALT  305<H>  /  AlkPhos  129<H>  01-04    PT/INR - ( 04 Jan 2023 14:42 )   PT: 17.1 sec;   INR: 1.48 ratio         PTT - ( 04 Jan 2023 14:42 )  PTT:26.3 sec

## 2023-01-04 NOTE — ED PROVIDER NOTE - PROGRESS NOTE DETAILS
surgery reviewed images, state pt can be admitted to their service, no need for abx now. -Johanna Ramirez PA-C

## 2023-01-04 NOTE — ED ADULT NURSE NOTE - ED STAT RN HANDOFF DETAILS 2
no
handoff report received from COREEN Moreira. pt is resting in chair with daughter at side, breathing even and unlabored, pt awake a&ox4, VSS, NAD noted.

## 2023-01-04 NOTE — H&P ADULT - NSHPPHYSICALEXAM_GEN_ALL_CORE
PHYSICAL EXAM  GENERAL: NAD, lying in bed comfortably  CHEST: nonlabored, no increased WOB  ABDOMEN: Soft, Nontender, Nondistended.   EXTREMITIES: Well perfused. No clubbing, cyanosis, or edema  NERVOUS SYSTEM:  Alert & Oriented X3

## 2023-01-04 NOTE — ED PROVIDER NOTE - OBJECTIVE STATEMENT
89 yo female pmhx HTN, asthma, hypothyroidism, s/p PAF on ac with NOAC, history of ILR, s/p MDT PPM for tachy-inocencia syndrome 89 yo female pmhx HTN, asthma, hypothyroidism, s/p PAF on ac with NOAC, history of ILR, s/p MDT PPM for tachy-inocencia syndrome presents to the ED for abnormal labs tests as outpatient. Pt states she was supposed to have stress test this week, had isolated 103F fever 2 days ago, went to her PMD yesterday for bloodwork for evaluation and was called last night and told LFTs were elevated and to come to ED asap. Pt reports known gallstones. Denies any abdominal pain, cp, sob, n/v/d, fever today, chills, URI sxs, loss of appetite, weight loss, dizziness, lightheadedness.

## 2023-01-04 NOTE — ED PROVIDER NOTE - ATTENDING APP SHARED VISIT CONTRIBUTION OF CARE
Brock Covington MD:  I personally saw the patient and performed a substantive portion of the visit including all aspects of the medical decision making.    MDM:-89year-old female with history of HTN, asthma, hypothyroidism, s/p PAF on ac with NOAC, history of ILR, s/p MDT PPM for tachy-inocencia syndrome who presents with elevated LFTs.  Patient with fever 2 days ago T-max 103F.  Patient had outpatient labs that showed elevated LFTs and bilirubin.  Patient denies any abdomen pain, chest pain, shortness of breath, nausea, vomiting, diarrhea, constipation.    On examination, patient without significant icterus, no abdomen tenderness, negative Farooq's, no peritoneal signs.    Concern for transaminitis and hyperbilirubinemia.  Will obtain labs to evaluate for hematologic disorder, metabolic derangements, hepatic and renal function, and screen for infectious pathology.  Will obtain ultrasound of right upper quadrant to evaluate for hepatobiliary pathology.    Surgery team consulted and already aware.    Labs reviewed, elevated INR, potassium moderate hemolysis elevated, elevated bilirubin, alk phos, AST, ALT.  Ultrasound results still pending.  Surgery team recommended for admission to their service.  Insert admission the patient will need to be admitted to the hospital for continued evaluation and management, as well as to optimize medical management and to provide outpatient needs assessment.  Discussed with the accepting physician regarding the initial presentation, diagnostic studies, treatments given in the ED, and current plan of care.   The patient was accepted by and endorsed to the surgery team.  Patient to remain NPO.

## 2023-01-04 NOTE — PATIENT PROFILE ADULT - FALL HARM RISK - HARM RISK INTERVENTIONS

## 2023-01-05 LAB
A1C WITH ESTIMATED AVERAGE GLUCOSE RESULT: 6.6 % — HIGH (ref 4–5.6)
ALBUMIN SERPL ELPH-MCNC: 3.3 G/DL — SIGNIFICANT CHANGE UP (ref 3.3–5)
ALP SERPL-CCNC: 111 U/L — SIGNIFICANT CHANGE UP (ref 40–120)
ALT FLD-CCNC: 207 U/L — HIGH (ref 10–45)
ANION GAP SERPL CALC-SCNC: 14 MMOL/L — SIGNIFICANT CHANGE UP (ref 5–17)
AST SERPL-CCNC: 70 U/L — HIGH (ref 10–40)
BILIRUB SERPL-MCNC: 1.4 MG/DL — HIGH (ref 0.2–1.2)
BUN SERPL-MCNC: 29 MG/DL — HIGH (ref 7–23)
CALCIUM SERPL-MCNC: 8.7 MG/DL — SIGNIFICANT CHANGE UP (ref 8.4–10.5)
CHLORIDE SERPL-SCNC: 106 MMOL/L — SIGNIFICANT CHANGE UP (ref 96–108)
CO2 SERPL-SCNC: 19 MMOL/L — LOW (ref 22–31)
CREAT SERPL-MCNC: 1.45 MG/DL — HIGH (ref 0.5–1.3)
EGFR: 34 ML/MIN/1.73M2 — LOW
ESTIMATED AVERAGE GLUCOSE: 143 MG/DL — HIGH (ref 68–114)
GLUCOSE BLDC GLUCOMTR-MCNC: 115 MG/DL — HIGH (ref 70–99)
GLUCOSE BLDC GLUCOMTR-MCNC: 188 MG/DL — HIGH (ref 70–99)
GLUCOSE BLDC GLUCOMTR-MCNC: 84 MG/DL — SIGNIFICANT CHANGE UP (ref 70–99)
GLUCOSE SERPL-MCNC: 84 MG/DL — SIGNIFICANT CHANGE UP (ref 70–99)
HCT VFR BLD CALC: 35.9 % — SIGNIFICANT CHANGE UP (ref 34.5–45)
HGB BLD-MCNC: 11.3 G/DL — LOW (ref 11.5–15.5)
MAGNESIUM SERPL-MCNC: 2 MG/DL — SIGNIFICANT CHANGE UP (ref 1.6–2.6)
MCHC RBC-ENTMCNC: 24.8 PG — LOW (ref 27–34)
MCHC RBC-ENTMCNC: 31.5 GM/DL — LOW (ref 32–36)
MCV RBC AUTO: 78.7 FL — LOW (ref 80–100)
NRBC # BLD: 0 /100 WBCS — SIGNIFICANT CHANGE UP (ref 0–0)
PHOSPHATE SERPL-MCNC: 2.4 MG/DL — LOW (ref 2.5–4.5)
PLATELET # BLD AUTO: 152 K/UL — SIGNIFICANT CHANGE UP (ref 150–400)
POTASSIUM SERPL-MCNC: 4.2 MMOL/L — SIGNIFICANT CHANGE UP (ref 3.5–5.3)
POTASSIUM SERPL-SCNC: 4.2 MMOL/L — SIGNIFICANT CHANGE UP (ref 3.5–5.3)
PROT SERPL-MCNC: 6.3 G/DL — SIGNIFICANT CHANGE UP (ref 6–8.3)
RBC # BLD: 4.56 M/UL — SIGNIFICANT CHANGE UP (ref 3.8–5.2)
RBC # FLD: 14.6 % — HIGH (ref 10.3–14.5)
SODIUM SERPL-SCNC: 139 MMOL/L — SIGNIFICANT CHANGE UP (ref 135–145)
WBC # BLD: 5.15 K/UL — SIGNIFICANT CHANGE UP (ref 3.8–10.5)
WBC # FLD AUTO: 5.15 K/UL — SIGNIFICANT CHANGE UP (ref 3.8–10.5)

## 2023-01-05 PROCEDURE — 43274 ERCP DUCT STENT PLACEMENT: CPT

## 2023-01-05 PROCEDURE — 93010 ELECTROCARDIOGRAM REPORT: CPT

## 2023-01-05 PROCEDURE — 71045 X-RAY EXAM CHEST 1 VIEW: CPT | Mod: 26

## 2023-01-05 PROCEDURE — 76000 FLUOROSCOPY <1 HR PHYS/QHP: CPT | Mod: 26,59

## 2023-01-05 DEVICE — CATH BLLN EXTRACT DIST GUIDE WIRE 15MM 3LUM: Type: IMPLANTABLE DEVICE | Status: FUNCTIONAL

## 2023-01-05 DEVICE — BLLN EXTRACT FUSION QUATRO 8.5 10 12 15MM: Type: IMPLANTABLE DEVICE | Status: FUNCTIONAL

## 2023-01-05 DEVICE — AUTOTOME CANNULATING SPHINCTEROTOME RX 44 20MM: Type: IMPLANTABLE DEVICE | Status: FUNCTIONAL

## 2023-01-05 DEVICE — IMPLANTABLE DEVICE: Type: IMPLANTABLE DEVICE | Status: FUNCTIONAL

## 2023-01-05 DEVICE — GWIRE JAGTOME REVOLUTION RX 260CM/0.025IN: Type: IMPLANTABLE DEVICE | Status: FUNCTIONAL

## 2023-01-05 RX ORDER — INSULIN LISPRO 100/ML
VIAL (ML) SUBCUTANEOUS
Refills: 0 | Status: DISCONTINUED | OUTPATIENT
Start: 2023-01-05 | End: 2023-01-06

## 2023-01-05 RX ORDER — DEXTROSE MONOHYDRATE, SODIUM CHLORIDE, AND POTASSIUM CHLORIDE 50; .745; 4.5 G/1000ML; G/1000ML; G/1000ML
1000 INJECTION, SOLUTION INTRAVENOUS
Refills: 0 | Status: DISCONTINUED | OUTPATIENT
Start: 2023-01-05 | End: 2023-01-06

## 2023-01-05 RX ORDER — INSULIN LISPRO 100/ML
VIAL (ML) SUBCUTANEOUS
Refills: 0 | Status: DISCONTINUED | OUTPATIENT
Start: 2023-01-05 | End: 2023-01-05

## 2023-01-05 RX ORDER — PANTOPRAZOLE SODIUM 20 MG/1
40 TABLET, DELAYED RELEASE ORAL DAILY
Refills: 0 | Status: DISCONTINUED | OUTPATIENT
Start: 2023-01-05 | End: 2023-01-06

## 2023-01-05 RX ORDER — INSULIN LISPRO 100/ML
VIAL (ML) SUBCUTANEOUS AT BEDTIME
Refills: 0 | Status: DISCONTINUED | OUTPATIENT
Start: 2023-01-05 | End: 2023-01-06

## 2023-01-05 RX ADMIN — Medication 5 MILLIGRAM(S): at 23:02

## 2023-01-05 RX ADMIN — PIPERACILLIN AND TAZOBACTAM 25 GRAM(S): 4; .5 INJECTION, POWDER, LYOPHILIZED, FOR SOLUTION INTRAVENOUS at 21:55

## 2023-01-05 RX ADMIN — Medication 5 MILLIGRAM(S): at 17:28

## 2023-01-05 RX ADMIN — PIPERACILLIN AND TAZOBACTAM 25 GRAM(S): 4; .5 INJECTION, POWDER, LYOPHILIZED, FOR SOLUTION INTRAVENOUS at 06:05

## 2023-01-05 RX ADMIN — Medication 90 MICROGRAM(S): at 21:55

## 2023-01-05 RX ADMIN — Medication 85 MILLIMOLE(S): at 16:36

## 2023-01-05 RX ADMIN — PIPERACILLIN AND TAZOBACTAM 25 GRAM(S): 4; .5 INJECTION, POWDER, LYOPHILIZED, FOR SOLUTION INTRAVENOUS at 01:42

## 2023-01-05 RX ADMIN — DEXTROSE MONOHYDRATE, SODIUM CHLORIDE, AND POTASSIUM CHLORIDE 75 MILLILITER(S): 50; .745; 4.5 INJECTION, SOLUTION INTRAVENOUS at 17:39

## 2023-01-05 RX ADMIN — ENOXAPARIN SODIUM 30 MILLIGRAM(S): 100 INJECTION SUBCUTANEOUS at 21:56

## 2023-01-05 NOTE — CONSULT NOTE ADULT - SUBJECTIVE AND OBJECTIVE BOX
C A R D I O L O G Y  *********************    DATE OF SERVICE: 01-05-23    HISTORY OF PRESENT ILLNESS: HPI:  Patient is a 89 y/o female with PMH of HTN, HLD, DM, hypothyroid, PAF (on Xarelto), and PPM (for tachy-inocencia syndrome) who is admitted with elevated LFTs. Patient also being worked up by outpatient cardiologist for HOU. Cardiology consulted for further evaluation. Patient seen s/p ERCP in recovery. Denies chest pain, SOB at rest, palpitations, dizziness, or syncope.    PAST MEDICAL & SURGICAL HISTORY:  Asthma      Pneumonia  11/10      Hyperlipidemia      Osteoporosis      Hypertension      Hypothyroid      Osteoporosis      Lumbar Spinal Stenosis      Lumbosacral Neuritis      Female Stress Incontinence      Status Post Total Knee Replacement  bilateral 2006, 2007              MEDICATIONS:  MEDICATIONS  (STANDING):  dextrose 5%. 1000 milliLiter(s) (50 mL/Hr) IV Continuous <Continuous>  dextrose 5%. 1000 milliLiter(s) (100 mL/Hr) IV Continuous <Continuous>  dextrose 50% Injectable 25 Gram(s) IV Push once  dextrose 50% Injectable 12.5 Gram(s) IV Push once  dextrose 50% Injectable 25 Gram(s) IV Push once  enoxaparin Injectable 30 milliGRAM(s) SubCutaneous every 24 hours  glucagon  Injectable 1 milliGRAM(s) IntraMuscular once  insulin lispro (ADMELOG) corrective regimen sliding scale   SubCutaneous every 6 hours  lactated ringers. 1000 milliLiter(s) (75 mL/Hr) IV Continuous <Continuous>  levothyroxine Injectable 90 MICROGram(s) IV Push at bedtime  metoprolol tartrate Injectable 5 milliGRAM(s) IV Push every 6 hours  pantoprazole  Injectable 40 milliGRAM(s) IV Push daily  piperacillin/tazobactam IVPB.. 3.375 Gram(s) IV Intermittent every 8 hours  sodium phosphate 30 milliMole(s)/500 mL IVPB 30 milliMole(s) IV Intermittent once      Allergies    No Known Allergies    Intolerances        FAMILY HISTORY:    Non-contributary for premature coronary disease or sudden cardiac death    SOCIAL HISTORY:    [ x] Non-smoker  [ ] Smoker  [ ] Alcohol    FLU VACCINE THIS YEAR STARTS IN AUGUST:  [ ] Yes    [ ] No    IF OVER 65 HAVE YOU EVER HAD A PNA VACCINE:  [ ] Yes    [ ] No       [ ] N/A      REVIEW OF SYSTEMS:  [ ]chest pain  [ x ]shortness of breath  [  ]palpitations  [  ]syncope  [ ]near syncope [ ]upper extremity weakness   [ ] lower extremity weakness  [  ]diplopia  [  ]altered mental status   [  ]fevers  [ ]chills [ ]nausea  [ ]vomiting  [  ]dysphagia    [ ]abdominal pain  [ ]melena  [ ]BRBPR    [  ]epistaxis  [  ]rash    [ ]lower extremity edema        [X] All others negative	  [ ] Unable to obtain      LABS:	 	    CARDIAC MARKERS:                              11.3   5.15  )-----------( 152      ( 05 Jan 2023 06:37 )             35.9     Hb Trend: 11.3<--    01-05    139  |  106  |  29<H>  ----------------------------<  84  4.2   |  19<L>  |  1.45<H>    Ca    8.7      05 Jan 2023 06:37  Phos  2.4     01-05  Mg     2.0     01-05    TPro  6.3  /  Alb  3.3  /  TBili  1.4<H>  /  DBili  x   /  AST  70<H>  /  ALT  207<H>  /  AlkPhos  111  01-05    Creatinine Trend: 1.45<--, 1.69<--    Coags:      proBNP:   Lipid Profile:   HgA1c:   TSH:         PHYSICAL EXAM:  T(C): 36 (01-05-23 @ 14:45), Max: 36.9 (01-04-23 @ 16:49)  HR: 56 (01-05-23 @ 15:45) (55 - 76)  BP: 142/61 (01-05-23 @ 15:45) (94/64 - 166/79)  RR: 19 (01-05-23 @ 15:45) (16 - 23)  SpO2: 96% (01-05-23 @ 15:45) (96% - 99%)  Wt(kg): --   BMI (kg/m2): 26.6 (01-04-23 @ 11:03)  I&O's Summary    04 Jan 2023 07:01  -  05 Jan 2023 07:00  --------------------------------------------------------  IN: 1485 mL / OUT: 600 mL / NET: 885 mL    05 Jan 2023 07:01  -  05 Jan 2023 15:51  --------------------------------------------------------  IN: 0 mL / OUT: 350 mL / NET: -350 mL        Gen: Appears well in NAD  HEENT:  (-)icterus (-)pallor  CV: N S1 S2 1/6 ALEX (+)2 Pulses B/l  Resp:  Clear to auscultation B/L, normal effort  GI: (+) BS Soft, NT, ND  Lymph:  (-)Edema, (-)obvious lymphadenopathy  Skin: Warm to touch, Normal turgor  Psych: Appropriate mood and affect      TELEMETRY: None 	      ECG: Pending	    ASSESSMENT/PLAN: Patient is a 89 y/o female with PMH of HTN, HLD, DM, hypothyroid, PAF (on Xarelto), and PPM (for tachy-inocencia syndrome) who is admitted with elevated LFTs. Patient also being worked up by outpatient cardiologist for HOU. Cardiology consulted for further evaluation.    #Elevated LFTs  - GI follow up  - s/p ERCP 1/5 noted: Dilated CBD with filling defects c/f bile duct stones or sludge. Pus in duct concerning for cholangitis. FCSEMS placed for drainage as well as to break up stones.  - Tolerated procedure well from CV perspective    #HOU  - Unclear etiology - check CXR  - EP consult appreciated - PPM check with no issues. Does not have chronotropic incompetence  - Had recent normal stress test and echo with normal LV function in 12/2022 per outpatient cardiologist    #PAF  - Resume Xarelto s/p ERCP when ok with GI  - Currently on IV lopressor - resume home PO metoprolol when tolerating PO    Presley Britt PA-C  Pager: [phone]   C A R D I O L O G Y  *********************    DATE OF SERVICE: 01-05-23    HISTORY OF PRESENT ILLNESS: HPI:  Patient is a 89 y/o female with PMH of HTN, HLD, DM, hypothyroid, PAF (on Xarelto), and PPM (for tachy-inocencia syndrome) who is admitted with elevated LFTs. Patient also being worked up by outpatient cardiologist for HOU. Cardiology consulted for further evaluation. Patient seen s/p ERCP in recovery. Denies chest pain, SOB at rest, palpitations, dizziness, or syncope.    PAST MEDICAL & SURGICAL HISTORY:  Asthma      Pneumonia  11/10      Hyperlipidemia      Osteoporosis      Hypertension      Hypothyroid      Osteoporosis      Lumbar Spinal Stenosis      Lumbosacral Neuritis      Female Stress Incontinence      Status Post Total Knee Replacement  bilateral 2006, 2007              MEDICATIONS:  MEDICATIONS  (STANDING):  dextrose 5%. 1000 milliLiter(s) (50 mL/Hr) IV Continuous <Continuous>  dextrose 5%. 1000 milliLiter(s) (100 mL/Hr) IV Continuous <Continuous>  dextrose 50% Injectable 25 Gram(s) IV Push once  dextrose 50% Injectable 12.5 Gram(s) IV Push once  dextrose 50% Injectable 25 Gram(s) IV Push once  enoxaparin Injectable 30 milliGRAM(s) SubCutaneous every 24 hours  glucagon  Injectable 1 milliGRAM(s) IntraMuscular once  insulin lispro (ADMELOG) corrective regimen sliding scale   SubCutaneous every 6 hours  lactated ringers. 1000 milliLiter(s) (75 mL/Hr) IV Continuous <Continuous>  levothyroxine Injectable 90 MICROGram(s) IV Push at bedtime  metoprolol tartrate Injectable 5 milliGRAM(s) IV Push every 6 hours  pantoprazole  Injectable 40 milliGRAM(s) IV Push daily  piperacillin/tazobactam IVPB.. 3.375 Gram(s) IV Intermittent every 8 hours  sodium phosphate 30 milliMole(s)/500 mL IVPB 30 milliMole(s) IV Intermittent once      Allergies    No Known Allergies    Intolerances        FAMILY HISTORY:    Non-contributary for premature coronary disease or sudden cardiac death    SOCIAL HISTORY:    [ x] Non-smoker  [ ] Smoker  [ ] Alcohol    FLU VACCINE THIS YEAR STARTS IN AUGUST:  [ ] Yes    [ ] No    IF OVER 65 HAVE YOU EVER HAD A PNA VACCINE:  [ ] Yes    [ ] No       [ ] N/A      REVIEW OF SYSTEMS:  [ ]chest pain  [ x ]shortness of breath  [  ]palpitations  [  ]syncope  [ ]near syncope [ ]upper extremity weakness   [ ] lower extremity weakness  [  ]diplopia  [  ]altered mental status   [  ]fevers  [ ]chills [ ]nausea  [ ]vomiting  [  ]dysphagia    [ ]abdominal pain  [ ]melena  [ ]BRBPR    [  ]epistaxis  [  ]rash    [ ]lower extremity edema        [X] All others negative	  [ ] Unable to obtain      LABS:	 	    CARDIAC MARKERS:                              11.3   5.15  )-----------( 152      ( 05 Jan 2023 06:37 )             35.9     Hb Trend: 11.3<--    01-05    139  |  106  |  29<H>  ----------------------------<  84  4.2   |  19<L>  |  1.45<H>    Ca    8.7      05 Jan 2023 06:37  Phos  2.4     01-05  Mg     2.0     01-05    TPro  6.3  /  Alb  3.3  /  TBili  1.4<H>  /  DBili  x   /  AST  70<H>  /  ALT  207<H>  /  AlkPhos  111  01-05    Creatinine Trend: 1.45<--, 1.69<--        PHYSICAL EXAM:  T(C): 36 (01-05-23 @ 14:45), Max: 36.9 (01-04-23 @ 16:49)  HR: 56 (01-05-23 @ 15:45) (55 - 76)  BP: 142/61 (01-05-23 @ 15:45) (94/64 - 166/79)  RR: 19 (01-05-23 @ 15:45) (16 - 23)  SpO2: 96% (01-05-23 @ 15:45) (96% - 99%)  Wt(kg): --   BMI (kg/m2): 26.6 (01-04-23 @ 11:03)  I&O's Summary    04 Jan 2023 07:01  -  05 Jan 2023 07:00  --------------------------------------------------------  IN: 1485 mL / OUT: 600 mL / NET: 885 mL    05 Jan 2023 07:01  -  05 Jan 2023 15:51  --------------------------------------------------------  IN: 0 mL / OUT: 350 mL / NET: -350 mL        Gen: Appears well in NAD  HEENT:  (-)icterus (-)pallor  CV: N S1 S2 1/6 ALEX (+)2 Pulses B/l  Resp:  Clear to auscultation B/L, normal effort  GI: (+) BS Soft, NT, ND  Lymph:  (-)Edema, (-)obvious lymphadenopathy  Skin: Warm to touch, Normal turgor  Psych: Appropriate mood and affect      TELEMETRY: None 	      ECG: Pending	    ASSESSMENT/PLAN: Patient is a 89 y/o female with PMH of HTN, HLD, DM, hypothyroid, PAF (on Xarelto), and PPM (for tachy-inocencia syndrome) who is admitted with elevated LFTs. Patient also being worked up by outpatient cardiologist for HOU. Cardiology consulted for further evaluation.    #Elevated LFTs  - GI follow up  - s/p ERCP 1/5 noted: Dilated CBD with filling defects c/f bile duct stones or sludge. Pus in duct concerning for cholangitis. FCSEMS placed for drainage as well as to break up stones.  - Tolerated procedure well from CV perspective    #HOU  - Unclear etiology - check CXR  - EP consult appreciated - PPM check with no issues. Does not have chronotropic incompetence  - Had recent normal stress test and echo with normal LV function in 12/2022 per outpatient cardiologist    #PAF  - Resume Xarelto s/p ERCP when ok with GI  - Currently on IV lopressor - resume home PO metoprolol when tolerating PO    Presley Britt PA-C  Pager: [phone]

## 2023-01-05 NOTE — CONSULT NOTE ADULT - SUBJECTIVE AND OBJECTIVE BOX
EP Attending    HISTORY OF PRESENT ILLNESS: HPI:  89yo F h/o HTN, hypothyroid, Afib (on Xarelto), PPM (for tachy-inocencia syndrome) presenting to ER for elevated LFTs. Pt was having an outpt workup last week and found to have gallstones. Pt then got lab work in anticipation of seeing her cardiologist for a separate issue (increased dyspnea on exertion) - her lab work was significant for elevated LFTs, so her cardiologist sent her in to the ER. (04 Jan 2023 16:13)    Ms Taveras is a very pleasant 89yo woman who presents to the hospital for shortness of breath on exertion only, as well as some abdominal discomfort.  She is pending an ERCP to work up gallstone disease.  There is the clinical question of whether or not her pacemaker is optimized for her level of activity.  She walks without any mechanical aids, and does not fall or faint at home. She feels fine at rest, but with walking on flat ground can feel easily fatigued.  No palpitations.  While she is not aware of a history of AFib, it is in her chart history and she is prescribed Xarelto.    Her pulse generator is a MEDTRONIC dual chamber, but her pacing lead is a BI[employer]RONIK VDD lead with a hanging atrial bipole for atrial sensing only.  Per Medtronic reps, she is programmed to DDD mode but with the atrial output turned down.  No angina, no orthopnea. A 10 pt ROS is otherwise negative.    PAST MEDICAL & SURGICAL HISTORY:  Asthma  Pneumonia  11/10  Hyperlipidemia  Osteoporosis  Hypertension  Hypothyroid  Osteoporosis  Lumbar Spinal Stenosis  Lumbosacral Neuritis  Female Stress Incontinence  Status Post Total Knee Replacement  bilateral 2006, 2007      MEDICATIONS  (STANDING):  dextrose 5%. 1000 milliLiter(s) (50 mL/Hr) IV Continuous <Continuous>  dextrose 5%. 1000 milliLiter(s) (100 mL/Hr) IV Continuous <Continuous>  dextrose 50% Injectable 25 Gram(s) IV Push once  dextrose 50% Injectable 12.5 Gram(s) IV Push once  dextrose 50% Injectable 25 Gram(s) IV Push once  enoxaparin Injectable 30 milliGRAM(s) SubCutaneous every 24 hours  glucagon  Injectable 1 milliGRAM(s) IntraMuscular once  insulin lispro (ADMELOG) corrective regimen sliding scale   SubCutaneous every 6 hours  lactated ringers. 1000 milliLiter(s) (75 mL/Hr) IV Continuous <Continuous>  levothyroxine Injectable 90 MICROGram(s) IV Push at bedtime  metoprolol tartrate Injectable 5 milliGRAM(s) IV Push every 6 hours  piperacillin/tazobactam IVPB.. 3.375 Gram(s) IV Intermittent every 8 hours  sodium phosphate 30 milliMole(s)/500 mL IVPB 30 milliMole(s) IV Intermittent once      Allergies    No Known Allergies    Intolerances    FAMILY HISTORY:    Non-contributary for premature coronary disease or sudden cardiac death    SOCIAL HISTORY:    [x ] Non-smoker  [ ] Smoker  [ ] Alcohol      PHYSICAL EXAM:  T(C): 36.7 (01-05-23 @ 09:16), Max: 36.9 (01-04-23 @ 16:49)  HR: 68 (01-05-23 @ 09:16) (67 - 79)  BP: 151/75 (01-05-23 @ 09:16) (94/64 - 162/94)  RR: 18 (01-05-23 @ 09:16) (16 - 20)  SpO2: 96% (01-05-23 @ 09:16) (96% - 99%)  Wt(kg): --    Appearance: Well appearing adult woman who appears younger than stated age.	  HEENT:   Normal oral mucosa, PERRL, EOMI	  Lymphatic: No lymphadenopathy , no edema  Cardiovascular: Normal S1 S2, No JVD, No murmurs , Peripheral pulses palpable 2+ bilaterally, pacemaker in left upper chest.  Respiratory: Lungs clear to auscultation, normal effort 	  Gastrointestinal:  Soft, Non-tender, + BS	  Skin: No rashes, No ecchymoses, No cyanosis, warm to touch  Musculoskeletal: Normal range of motion, normal strength  Psychiatry:  Mood & affect appropriate    TELEMETRY: none	    ECG:  In 2021, NSR with narrow complex QRS.	  Echo: < from: TTE with Doppler (w/Cont) (03.17.21 @ 14:22) >  Dimensions:    Normal Values:  LA:     3.8    2.0 - 4.0 cm  Ao:     3.3    2.0 - 3.8 cm  SEPTUM: 0.9    0.6 - 1.2 cm  PWT:    1.0    0.6 - 1.1 cm  LVIDd:  3.6    3.0 - 5.6 cm  LVIDs:  2.2    1.8 - 4.0 cm  Derived variables:  LVMI: 56 g/m2  RWT: 0.55  EF (Visual Estimate): 70 %  Doppler Peak Velocity (m/sec): AoV=2.0 TV=2.0  ------------------------------------------------------------------------  Observations:  Mitral Valve: Normal mitral valve. Mitral annular  calcification.  Aortic Valve/Aorta: Fibrocalcific aortic valve without  stenosis.  .  Normal aortic root.  Left Atrium: Normal left atrium.  Left Ventricle: Endocardial visualization enhanced with  intravenous injection of Ultrasonic Enhancing Agent  (Definity).  Normal left ventricular internal dimensions and wall  thicknesses.  Normal left ventricular systolic function. No segmental  wall motion abnormalities.  Impaired LV-relaxation with normal filling pressure.  Right Heart: Normal right atrium. Normal right ventricular  size and function.  Normal tricuspid valve. Minimal tricuspid regurgitation.  Normal pulmonic valve.  Pericardium/Pleura: Normal pericardium with no pericardial  effusion.  Hemodynamic: Estimated right atrial pressue is normal.  No evidence of pulmonary hypertension.    < end of copied text >  	  LABS:	 	                          11.3   5.15  )-----------( 152      ( 05 Jan 2023 06:37 )             35.9     01-05    139  |  106  |  29<H>  ----------------------------<  84  4.2   |  19<L>  |  1.45<H>    Ca    8.7      05 Jan 2023 06:37  Phos  2.4     01-05  Mg     2.0     01-05    TPro  6.3  /  Alb  3.3  /  TBili  1.4<H>  /  DBili  x   /  AST  70<H>  /  ALT  207<H>  /  AlkPhos  111  01-05    ASSESSMENT/PLAN: 	Ms Taveras is a very pleasant 89y Female being seen for shortness of breath.  She is currently not on telemetry and does not have an inpatient ECG.  Will order an ECG.  She has a Medtronic dual chamber pacemaker that is connected to a [employer] VDD lead.  I've asked [employer] to check her device and I will comment on the next step for her management.  Pending check of her PPM battery and pacing threshold she can be cleared for ERCP today.  Keep her NPO.  Further testing such as Modified Gilbert Treadmill for chronotropic competence, or PPM upgrade, will be discussed with family and planned for afterwards.  Followup will be with Dr Diaz Goddard on discharge.      Sebastian Krause M.D.  Cardiac Electrophysiology    office [phone]  pager [phone] EP Attending    HISTORY OF PRESENT ILLNESS: HPI:  89yo F h/o HTN, hypothyroid, Afib (on Xarelto), PPM (for tachy-inocencia syndrome) presenting to ER for elevated LFTs. Pt was having an outpt workup last week and found to have gallstones. Pt then got lab work in anticipation of seeing her cardiologist for a separate issue (increased dyspnea on exertion) - her lab work was significant for elevated LFTs, so her cardiologist sent her in to the ER. (04 Jan 2023 16:13)    Ms Taveras is a very pleasant 89yo woman who presents to the hospital for shortness of breath on exertion only, as well as some abdominal discomfort.  She is pending an ERCP to work up gallstone disease.  There is the clinical question of whether or not her pacemaker is optimized for her level of activity.  She walks without any mechanical aids, and does not fall or faint at home. She feels fine at rest, but with walking on flat ground can feel easily fatigued.  No palpitations.  While she is not aware of a history of AFib, it is in her chart history and she is prescribed Xarelto.    Her pulse generator is a MEDTRONIC dual chamber, but her pacing lead is a [employer] VDD lead with a hanging atrial bipole for atrial sensing only.  Per Medtronic reps, she is programmed to DDD mode but with the atrial output turned down.  No angina, no orthopnea. A 10 pt ROS is otherwise negative.    PAST MEDICAL & SURGICAL HISTORY:  Asthma  Pneumonia  11/10  Hyperlipidemia  Osteoporosis  Hypertension  Hypothyroid  Osteoporosis  Lumbar Spinal Stenosis  Lumbosacral Neuritis  Female Stress Incontinence  Status Post Total Knee Replacement  bilateral 2006, 2007      MEDICATIONS  (STANDING):  dextrose 5%. 1000 milliLiter(s) (50 mL/Hr) IV Continuous <Continuous>  dextrose 5%. 1000 milliLiter(s) (100 mL/Hr) IV Continuous <Continuous>  dextrose 50% Injectable 25 Gram(s) IV Push once  dextrose 50% Injectable 12.5 Gram(s) IV Push once  dextrose 50% Injectable 25 Gram(s) IV Push once  enoxaparin Injectable 30 milliGRAM(s) SubCutaneous every 24 hours  glucagon  Injectable 1 milliGRAM(s) IntraMuscular once  insulin lispro (ADMELOG) corrective regimen sliding scale   SubCutaneous every 6 hours  lactated ringers. 1000 milliLiter(s) (75 mL/Hr) IV Continuous <Continuous>  levothyroxine Injectable 90 MICROGram(s) IV Push at bedtime  metoprolol tartrate Injectable 5 milliGRAM(s) IV Push every 6 hours  piperacillin/tazobactam IVPB.. 3.375 Gram(s) IV Intermittent every 8 hours  sodium phosphate 30 milliMole(s)/500 mL IVPB 30 milliMole(s) IV Intermittent once      Allergies    No Known Allergies    Intolerances    FAMILY HISTORY:    Non-contributary for premature coronary disease or sudden cardiac death    SOCIAL HISTORY:    [x ] Non-smoker  [ ] Smoker  [ ] Alcohol      PHYSICAL EXAM:  T(C): 36.7 (01-05-23 @ 09:16), Max: 36.9 (01-04-23 @ 16:49)  HR: 68 (01-05-23 @ 09:16) (67 - 79)  BP: 151/75 (01-05-23 @ 09:16) (94/64 - 162/94)  RR: 18 (01-05-23 @ 09:16) (16 - 20)  SpO2: 96% (01-05-23 @ 09:16) (96% - 99%)  Wt(kg): --    Appearance: Well appearing adult woman who appears younger than stated age.	  HEENT:   Normal oral mucosa, PERRL, EOMI	  Lymphatic: No lymphadenopathy , no edema  Cardiovascular: Normal S1 S2, No JVD, No murmurs , Peripheral pulses palpable 2+ bilaterally, pacemaker in left upper chest.  Respiratory: Lungs clear to auscultation, normal effort 	  Gastrointestinal:  Soft, Non-tender, + BS	  Skin: No rashes, No ecchymoses, No cyanosis, warm to touch  Musculoskeletal: Normal range of motion, normal strength  Psychiatry:  Mood & affect appropriate    TELEMETRY: none	    ECG:  In 2021, NSR with narrow complex QRS.	  Echo: < from: TTE with Doppler (w/Cont) (03.17.21 @ 14:22) >  Dimensions:    Normal Values:  LA:     3.8    2.0 - 4.0 cm  Ao:     3.3    2.0 - 3.8 cm  SEPTUM: 0.9    0.6 - 1.2 cm  PWT:    1.0    0.6 - 1.1 cm  LVIDd:  3.6    3.0 - 5.6 cm  LVIDs:  2.2    1.8 - 4.0 cm  Derived variables:  LVMI: 56 g/m2  RWT: 0.55  EF (Visual Estimate): 70 %  Doppler Peak Velocity (m/sec): AoV=2.0 TV=2.0  ------------------------------------------------------------------------  Observations:  Mitral Valve: Normal mitral valve. Mitral annular  calcification.  Aortic Valve/Aorta: Fibrocalcific aortic valve without  stenosis.  .  Normal aortic root.  Left Atrium: Normal left atrium.  Left Ventricle: Endocardial visualization enhanced with  intravenous injection of Ultrasonic Enhancing Agent  (Definity).  Normal left ventricular internal dimensions and wall  thicknesses.  Normal left ventricular systolic function. No segmental  wall motion abnormalities.  Impaired LV-relaxation with normal filling pressure.  Right Heart: Normal right atrium. Normal right ventricular  size and function.  Normal tricuspid valve. Minimal tricuspid regurgitation.  Normal pulmonic valve.  Pericardium/Pleura: Normal pericardium with no pericardial  effusion.  Hemodynamic: Estimated right atrial pressue is normal.  No evidence of pulmonary hypertension.    < end of copied text >  	  LABS:	 	                          11.3   5.15  )-----------( 152      ( 05 Jan 2023 06:37 )             35.9     01-05    139  |  106  |  29<H>  ----------------------------<  84  4.2   |  19<L>  |  1.45<H>    Ca    8.7      05 Jan 2023 06:37  Phos  2.4     01-05  Mg     2.0     01-05    TPro  6.3  /  Alb  3.3  /  TBili  1.4<H>  /  DBili  x   /  AST  70<H>  /  ALT  207<H>  /  AlkPhos  111  01-05    ASSESSMENT/PLAN: 	Ms Taveras is a very pleasant 89y Female being seen for shortness of breath.  She is currently not on telemetry and does not have an inpatient ECG.  Will order an ECG.  She has a Medtronic dual chamber pacemaker that is connected to a [employer] VDD lead.  I've asked [employer] to check her device and I will comment on the next step for her management.  Pending check of her PPM battery and pacing threshold she can be cleared for ERCP today.  Keep her NPO.  Further testing such as Modified Gilbert Treadmill for chronotropic competence, or PPM upgrade, will be discussed with family and planned for afterwards.  Followup will be with Dr Diaz Goddard on discharge.      ADDENDUM:  Pacemaker check shows her device in good working order.  Battery life is >13yrs.  Ventricular pacing threshold is <1V @ 0.4ms.  She is in sinus rhythm with adequate sensing.  The heartrate histogram shows a normal distribution, suggesting that she does not have chronotropic incompetence.  She has short bursts of paroxysmal AFib.  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  There is no indication to change to a 'rate response' mode, as we cannot pace the right atrium with this type of pacing lead.  At this time, no further pacemaker modification is indicated, and she is cleared for ERCP from my perspective.  Can resume oral metoprolol when able to take pills.  Can hold Xarelto as needed for ERCP, but if this is going to delay her testing she should be allowed to resume clears or food intake.      Sebastian Krause M.D.  Cardiac Electrophysiology    office [phone]  pager [phone]

## 2023-01-05 NOTE — CONSULT NOTE ADULT - SUBJECTIVE AND OBJECTIVE BOX
HPI: 89F h/o HTN, hypothyroid, Afib (on Xarelto), PPM (for tachy-inocencia syndrome) presenting to ER for elevated LFTs and c/f choledocholithiasis.     Pt currently denies abdominal pain, n/v/f/c. Reports having fever last Tuesday (?101). Endorses HOU, denies CP, SOB at rest, palpitations.    Was seen by her outpatient physician, received blood work where her liver enzymes were found to be abnormal (Tbili in 3s?). Also per surgery team, patient had recent OSH visit where she was found to have (+) choledocholithiasis on CT a/p. There are no outpatient records in our system.     Allergies:  No Known Allergies      Home Medications:    Hospital Medications:  dextrose 5%. 1000 milliLiter(s) IV Continuous <Continuous>  dextrose 5%. 1000 milliLiter(s) IV Continuous <Continuous>  dextrose 50% Injectable 25 Gram(s) IV Push once  dextrose 50% Injectable 12.5 Gram(s) IV Push once  dextrose 50% Injectable 25 Gram(s) IV Push once  dextrose Oral Gel 15 Gram(s) Oral once PRN  enoxaparin Injectable 30 milliGRAM(s) SubCutaneous every 24 hours  glucagon  Injectable 1 milliGRAM(s) IntraMuscular once  insulin lispro (ADMELOG) corrective regimen sliding scale   SubCutaneous every 6 hours  lactated ringers. 1000 milliLiter(s) IV Continuous <Continuous>  levothyroxine Injectable 90 MICROGram(s) IV Push at bedtime  metoprolol tartrate Injectable 5 milliGRAM(s) IV Push every 6 hours  piperacillin/tazobactam IVPB.. 3.375 Gram(s) IV Intermittent every 8 hours  sodium phosphate 30 milliMole(s)/500 mL IVPB 30 milliMole(s) IV Intermittent once      PMHX/PSHX:  Asthma    Pneumonia    Hyperlipidemia    Osteoporosis    Hypertension    Hypothyroid    Osteoporosis    Lumbar Spinal Stenosis    Lumbosacral Neuritis    Female Stress Incontinence    Status Post Total Knee Replacement        Family history:      Denies family history of colon cancer/polyps, stomach cancer/polyps, pancreatic cancer/masses, liver cancer/disease, ovarian cancer and endometrial cancer.    Social History:     Tob: Denies  EtOH: Denies  Illicit Drugs: Denies    ROS:     General:  No wt loss, fevers, chills, night sweats, fatigue  Eyes:  Good vision, no reported pain  ENT:  No sore throat, pain, runny nose, dysphagia  CV:  No pain, palpitations, hypo/hypertension  Pulm:  No dyspnea, cough, tachypnea, wheezing  GI: see above  :  No pain, bleeding, incontinence, nocturia  Muscle:  No pain, weakness  Neuro:  No weakness, tingling, memory problems  Psych:  No fatigue, insomnia, mood problems, depression  Endocrine:  No polyuria, polydipsia, cold/heat intolerance  Heme:  No petechiae, ecchymosis, easy bruisability  Skin:  No rash, tattoos, scars, edema    PHYSICAL EXAM:     GENERAL:  No acute distress  HEENT:  Normocephalic/atraumatic, no scleral icterus  CHEST:  no accessory muscle use  HEART:  Regular rate and rhythm, no murmurs/rubs/gallops  ABDOMEN:  Soft, non-tender, non-distended  EXTREMITIES: No cyanosis, clubbing, or edema  SKIN:  No rash/warm/dry  NEURO:  Alert and oriented x 3        Vital Signs:  Vital Signs Last 24 Hrs  T(C): 36.7 (05 Jan 2023 09:16), Max: 36.9 (04 Jan 2023 16:49)  T(F): 98 (05 Jan 2023 09:16), Max: 98.5 (04 Jan 2023 20:25)  HR: 68 (05 Jan 2023 09:16) (67 - 79)  BP: 151/75 (05 Jan 2023 09:16) (94/64 - 162/94)  BP(mean): 82 (04 Jan 2023 20:50) (82 - 101)  RR: 18 (05 Jan 2023 09:16) (16 - 20)  SpO2: 96% (05 Jan 2023 09:16) (96% - 99%)    Parameters below as of 05 Jan 2023 09:16  Patient On (Oxygen Delivery Method): room air      Daily Height in cm: 167.64 (04 Jan 2023 11:03)    Daily     LABS:                        11.3   5.15  )-----------( 152      ( 05 Jan 2023 06:37 )             35.9     Mean Cell Volume: 78.7 fl (01-05-23 @ 06:37)    01-05    139  |  106  |  29<H>  ----------------------------<  84  4.2   |  19<L>  |  1.45<H>    Ca    8.7      05 Jan 2023 06:37  Phos  2.4     01-05  Mg     2.0     01-05    TPro  6.3  /  Alb  3.3  /  TBili  1.4<H>  /  DBili  x   /  AST  70<H>  /  ALT  207<H>  /  AlkPhos  111  01-05    LIVER FUNCTIONS - ( 05 Jan 2023 06:37 )  Alb: 3.3 g/dL / Pro: 6.3 g/dL / ALK PHOS: 111 U/L / ALT: 207 U/L / AST: 70 U/L / GGT: x           PT/INR - ( 04 Jan 2023 14:42 )   PT: 17.1 sec;   INR: 1.48 ratio         PTT - ( 04 Jan 2023 14:42 )  PTT:26.3 sec    Amylase Serum--      Lipase serum25       Ammonia--                          11.3   5.15  )-----------( 152      ( 05 Jan 2023 06:37 )             35.9                         11.6   6.60  )-----------( 141      ( 04 Jan 2023 14:41 )             37.7       Imaging:

## 2023-01-05 NOTE — CHART NOTE - NSCHARTNOTEFT_GEN_A_CORE
SURGERY Post Procedure Check     S/P ERCP     SUBJECTIVE:  Pt seen and examined at the bedside. Pt w/ no complaints. Denies F/C/N/V.       OBJECTIVE:  Vital Signs Last 24 Hrs  T(C): 36.7 (05 Jan 2023 20:29), Max: 36.9 (05 Jan 2023 16:40)  T(F): 98 (05 Jan 2023 20:29), Max: 98.5 (05 Jan 2023 16:40)  HR: 61 (05 Jan 2023 20:29) (55 - 72)  BP: 156/79 (05 Jan 2023 20:29) (94/64 - 166/79)  BP(mean): --  RR: 18 (05 Jan 2023 20:29) (18 - 23)  SpO2: 97% (05 Jan 2023 20:29) (96% - 99%)    Parameters below as of 05 Jan 2023 20:29  Patient On (Oxygen Delivery Method): room air        Physical Exam:  General: NAD, resting comfortably in bed  Neuro: A/O x 3, no focal deficits  Pulmonary: Nonlabored breathing, no respiratory distress  Cardiovascular: NSR  Abdominal: soft. ND  Extremities: WWP    LABS:                        11.3   5.15  )-----------( 152      ( 05 Jan 2023 06:37 )             35.9     01-05    139  |  106  |  29<H>  ----------------------------<  84  4.2   |  19<L>  |  1.45<H>    Ca    8.7      05 Jan 2023 06:37  Phos  2.4     01-05  Mg     2.0     01-05    TPro  6.3  /  Alb  3.3  /  TBili  1.4<H>  /  DBili  x   /  AST  70<H>  /  ALT  207<H>  /  AlkPhos  111  01-05    PT/INR - ( 04 Jan 2023 14:42 )   PT: 17.1 sec;   INR: 1.48 ratio         PTT - ( 04 Jan 2023 14:42 )  PTT:26.3 sec  CAPILLARY BLOOD GLUCOSE      POCT Blood Glucose.: 188 mg/dL (05 Jan 2023 21:11)  POCT Blood Glucose.: 115 mg/dL (05 Jan 2023 16:38)  POCT Blood Glucose.: 84 mg/dL (05 Jan 2023 05:04)  POCT Blood Glucose.: 97 mg/dL (04 Jan 2023 23:40)      LIVER FUNCTIONS - ( 05 Jan 2023 06:37 )  Alb: 3.3 g/dL / Pro: 6.3 g/dL / ALK PHOS: 111 U/L / ALT: 207 U/L / AST: 70 U/L / GGT: x             ASSESSMENT:89y Female now 4hours s/p ERCP     PLAN:  - Diet: CLD  - Continue Abx per GI   - Strict I/Os  - Lap Milagros planning    Green Team, [id number]

## 2023-01-05 NOTE — PROGRESS NOTE ADULT - ASSESSMENT
89yo F h/o HTN, hypothyroid, Afib (on Xarelto), PPM (for tachy-inocencia syndrome) presenting to ER for elevated LFTs, found to have likely choledocholithiasis.    PLAN:  - Choledocholithiasis: GI consulted, MRCP to assess for choledocholithiasis (TBili 3 on outpatient labs, 1.3 on admission - likely passing/passed stone)  - MATHEUS: Cr elevated to 1.69 (1-1.1 on previous admissions); IV hydration  - Abx: Zosyn  - GI following, appreciate [facility]    Green Team, [id number]   89yo F h/o HTN, hypothyroid, Afib (on Xarelto), PPM (for tachy-inocencia syndrome) presenting to ER for elevated LFTs, found to have likely choledocholithiasis.    PLAN:  - Choledocholithiasis: GI consulted, MRCP to assess for choledocholithiasis, possible ERCP for chronic ductal dilation (TBili 3 on outpatient labs, 1.3 on admission - likely passing/passed stone)  - MATHEUS: Cr elevated to 1.69 (1-1.1 on previous admissions); IV hydration  - Abx: Zosyn  - GI following, appreciate [facility]    Green Team, [id number]   89yo F h/o HTN, hypothyroid, Afib (on Xarelto), PPM (for tachy-inocencia syndrome) presenting to ER for elevated LFTs, found to have likely choledocholithiasis.    PLAN:  - Choledocholithiasis: GI consulted, MRCP to assess for choledocholithiasis, possible ERCP for chronic ductal dilation (TBili 3 on outpatient labs, 1.3 on admission - likely passing/passed stone)  - MATHEUS: Cr elevated to 1.69 (1-1.1 on previous admissions); IV hydration  - Abx: Zosyn  - GI following, appreciate [facility]    Green Team, [id number]

## 2023-01-05 NOTE — CONSULT NOTE ADULT - ASSESSMENT
89F h/o HTN, hypothyroid, Afib (on Xarelto), PPM (for tachy-inocencia syndrome) presenting to ER for elevated LFTs and c/f choledocholithiasis.       Impression:   #Cholelithiasis  #Elevated liver enzymes  #C/f choledocholithiasis  pt currently asymptomatic with Tbili elevated to 1.6, US RUQ suspicious for choledocholithiasis + CBD dilation to 8mm. Currently afebrile, HD stable without leukocytosis, does not meet clinical criteria for cholangitis at this time    #Afib: on xarelto, last taken 1/3/23      Recommendations:   - NPO  - PPM evaluation  - Plan for ERCP for tx choledocholithiasis   - Hold xarelto if clinically able  - Trend liver enzymes, CBC, fever curve    *Note not final unless signed by attending    Thank you for involving us in the care of this patient, please reach out if any further questions.     David Jama MD  Gastroenterology/Hepatology Fellow, PGY6    Available on [employer] Teams  [phone] ([facility])  [phone] ([facility])  Please contact on call fellow weekdays after 5pm-7am and weekends: [phone]  
Patient seen and examined, agree with above assessment and plan as transcribed above.    - EP eval appreciated no signs of chronotropic incompetence Rate hysteresis appropriate   - Tolerated ERCP    Moo Hills MD, [facility]  BEEPER [phone]

## 2023-01-05 NOTE — PROGRESS NOTE ADULT - SUBJECTIVE AND OBJECTIVE BOX
Surgery Progress Note    SUBJECTIVE: Pt seen and examined at bedside.     Vital Signs Last 24 Hrs  T(C): 36.7 (04 Jan 2023 21:07), Max: 36.9 (04 Jan 2023 16:49)  T(F): 98.1 (04 Jan 2023 21:07), Max: 98.5 (04 Jan 2023 20:25)  HR: 76 (04 Jan 2023 21:07) (67 - 79)  BP: 162/94 (04 Jan 2023 21:07) (103/72 - 162/94)  BP(mean): 82 (04 Jan 2023 20:50) (82 - 101)  RR: 18 (04 Jan 2023 21:07) (16 - 20)  SpO2: 99% (04 Jan 2023 21:07) (97% - 99%)    Parameters below as of 04 Jan 2023 21:07  Patient On (Oxygen Delivery Method): room air        Physical Exam:  General Appearance: Appears well, NAD  Respiratory: No labored breathing  CV: Pulse regularly present  Abdomen: Soft, nontender    LABS:                        11.6   6.60  )-----------( 141      ( 04 Jan 2023 14:41 )             37.7     01-04    137  |  106  |  36<H>  ----------------------------<  96  5.5<H>   |  19<L>  |  1.69<H>    Ca    9.2      04 Jan 2023 14:41    TPro  7.1  /  Alb  3.7  /  TBili  1.3<H>  /  DBili  0.2  /  AST  150<H>  /  ALT  305<H>  /  AlkPhos  129<H>  01-04    PT/INR - ( 04 Jan 2023 14:42 )   PT: 17.1 sec;   INR: 1.48 ratio         PTT - ( 04 Jan 2023 14:42 )  PTT:26.3 sec      INs and OUTs:    01-04-23 @ 07:01  -  01-05-23 @ 00:06  --------------------------------------------------------  IN: 120 mL / OUT: 0 mL / NET: 120 mL     Surgery Progress Note    SUBJECTIVE: Pt seen and examined at bedside. Patient slightly groggy this AM. Reports having no pain and waiting for her MRI. NPO, but would like to eat.    Vital Signs Last 24 Hrs  T(C): 36.7 (04 Jan 2023 21:07), Max: 36.9 (04 Jan 2023 16:49)  T(F): 98.1 (04 Jan 2023 21:07), Max: 98.5 (04 Jan 2023 20:25)  HR: 76 (04 Jan 2023 21:07) (67 - 79)  BP: 162/94 (04 Jan 2023 21:07) (103/72 - 162/94)  BP(mean): 82 (04 Jan 2023 20:50) (82 - 101)  RR: 18 (04 Jan 2023 21:07) (16 - 20)  SpO2: 99% (04 Jan 2023 21:07) (97% - 99%)    Parameters below as of 04 Jan 2023 21:07  Patient On (Oxygen Delivery Method): room air      Physical Exam:  General Appearance: Asleep on Am rounds, Appears well, NAD  Respiratory: No labored breathing  Abdomen: Soft, nontender, nondistended      LABS:                        11.6   6.60  )-----------( 141      ( 04 Jan 2023 14:41 )             37.7     01-04    137  |  106  |  36<H>  ----------------------------<  96  5.5<H>   |  19<L>  |  1.69<H>    Ca    9.2      04 Jan 2023 14:41    TPro  7.1  /  Alb  3.7  /  TBili  1.3<H>  /  DBili  0.2  /  AST  150<H>  /  ALT  305<H>  /  AlkPhos  129<H>  01-04    PT/INR - ( 04 Jan 2023 14:42 )   PT: 17.1 sec;   INR: 1.48 ratio         PTT - ( 04 Jan 2023 14:42 )  PTT:26.3 sec      INs and OUTs:    01-04-23 @ 07:01  -  01-05-23 @ 00:06  --------------------------------------------------------  IN: 120 mL / OUT: 0 mL / NET: 120 mL     Surgery Progress Note    SUBJECTIVE: Pt seen and examined at bedside. Patient woken up on AM rounds and was groggy upon examination. Reports having no pain, nausea, or vomiting. Says she is waiting for her MRI. NPO, but would like to eat.    Vital Signs Last 24 Hrs  T(C): 36.7 (04 Jan 2023 21:07), Max: 36.9 (04 Jan 2023 16:49)  T(F): 98.1 (04 Jan 2023 21:07), Max: 98.5 (04 Jan 2023 20:25)  HR: 76 (04 Jan 2023 21:07) (67 - 79)  BP: 162/94 (04 Jan 2023 21:07) (103/72 - 162/94)  BP(mean): 82 (04 Jan 2023 20:50) (82 - 101)  RR: 18 (04 Jan 2023 21:07) (16 - 20)  SpO2: 99% (04 Jan 2023 21:07) (97% - 99%)    Parameters below as of 04 Jan 2023 21:07  Patient On (Oxygen Delivery Method): room air      Physical Exam:  General Appearance: Asleep on AM rounds, Appears well, NAD  Respiratory: No labored breathing  Cardiovascular: Warm and well perfused  Abdomen: Soft, nontender, nondistended      LABS:                        11.6   6.60  )-----------( 141      ( 04 Jan 2023 14:41 )             37.7     01-04    137  |  106  |  36<H>  ----------------------------<  96  5.5<H>   |  19<L>  |  1.69<H>    Ca    9.2      04 Jan 2023 14:41    TPro  7.1  /  Alb  3.7  /  TBili  1.3<H>  /  DBili  0.2  /  AST  150<H>  /  ALT  305<H>  /  AlkPhos  129<H>  01-04    PT/INR - ( 04 Jan 2023 14:42 )   PT: 17.1 sec;   INR: 1.48 ratio      PTT - ( 04 Jan 2023 14:42 )  PTT:26.3 sec      INs and OUTs:    01-04-23 @ 07:01  -  01-05-23 @ 00:06  --------------------------------------------------------  IN: 120 mL / OUT: 0 mL / NET: 120 mL

## 2023-01-05 NOTE — PHYSICAL THERAPY INITIAL EVALUATION ADULT - PERTINENT HX OF CURRENT PROBLEM, REHAB EVAL
89yo F h/o HTN, hypothyroid, Afib (on Xarelto), PPM (for tachy-inocencia syndrome) presenting to ER for elevated LFTs. Pt was having an outpt workup last week and found to have gallstones. Pt then got lab work in anticipation of seeing her cardiologist for a separate issue (increased dyspnea on exertion) - her lab work was significant for elevated LFTs, so her cardiologist sent her in to the ER. Hospital Course: 1/4 US RT upper quadrant: Cholelithiasis and probable choledocholithiasis with biliary dilatation. Consider correlation with MRCP. No sonographic finding to suggest acute cholecystitis. Mild hydronephrosis involving the right kidney. 89yo F h/o HTN, hypothyroid, Afib (on Xarelto), PPM (for tachy-inocencia syndrome) presenting to ER for elevated LFTs. Pt was having an outpt workup last week and found to have gallstones. Pt then got lab work in anticipation of seeing her cardiologist for a separate issue (increased dyspnea on exertion) - her lab work was significant for elevated LFTs, so her cardiologist sent her in to the ER. Hospital Course: 1/4 US RT upper quadrant: Cholelithiasis and probable choledocholithiasis with biliary dilatation. Consider correlation with MRCP. No sonographic finding to suggest acute cholecystitis. Mild hydronephrosis involving the right kidney. 1/5 ERCP

## 2023-01-05 NOTE — PHYSICAL THERAPY INITIAL EVALUATION ADULT - ADDITIONAL COMMENTS
Pt lives alone in elevator access apartment; utilizes straight cane outside of home, transport chair for longer distances. Pt drives locally.

## 2023-01-06 ENCOUNTER — TRANSCRIPTION ENCOUNTER (OUTPATIENT)
Age: 88
End: 2023-01-06

## 2023-01-06 VITALS — OXYGEN SATURATION: 97 % | DIASTOLIC BLOOD PRESSURE: 92 MMHG | SYSTOLIC BLOOD PRESSURE: 122 MMHG | HEART RATE: 67 BPM

## 2023-01-06 LAB
ALBUMIN SERPL ELPH-MCNC: 3.2 G/DL — LOW (ref 3.3–5)
ALP SERPL-CCNC: 93 U/L — SIGNIFICANT CHANGE UP (ref 40–120)
ALT FLD-CCNC: 141 U/L — HIGH (ref 10–45)
ANION GAP SERPL CALC-SCNC: 10 MMOL/L — SIGNIFICANT CHANGE UP (ref 5–17)
AST SERPL-CCNC: 32 U/L — SIGNIFICANT CHANGE UP (ref 10–40)
BILIRUB SERPL-MCNC: 1 MG/DL — SIGNIFICANT CHANGE UP (ref 0.2–1.2)
BUN SERPL-MCNC: 18 MG/DL — SIGNIFICANT CHANGE UP (ref 7–23)
CALCIUM SERPL-MCNC: 8.1 MG/DL — LOW (ref 8.4–10.5)
CHLORIDE SERPL-SCNC: 107 MMOL/L — SIGNIFICANT CHANGE UP (ref 96–108)
CO2 SERPL-SCNC: 21 MMOL/L — LOW (ref 22–31)
CREAT SERPL-MCNC: 1.23 MG/DL — SIGNIFICANT CHANGE UP (ref 0.5–1.3)
EGFR: 41 ML/MIN/1.73M2 — LOW
GLUCOSE BLDC GLUCOMTR-MCNC: 110 MG/DL — HIGH (ref 70–99)
GLUCOSE BLDC GLUCOMTR-MCNC: 186 MG/DL — HIGH (ref 70–99)
GLUCOSE SERPL-MCNC: 169 MG/DL — HIGH (ref 70–99)
HCT VFR BLD CALC: 36.2 % — SIGNIFICANT CHANGE UP (ref 34.5–45)
HGB BLD-MCNC: 11.2 G/DL — LOW (ref 11.5–15.5)
MAGNESIUM SERPL-MCNC: 1.9 MG/DL — SIGNIFICANT CHANGE UP (ref 1.6–2.6)
MCHC RBC-ENTMCNC: 24.6 PG — LOW (ref 27–34)
MCHC RBC-ENTMCNC: 30.9 GM/DL — LOW (ref 32–36)
MCV RBC AUTO: 79.4 FL — LOW (ref 80–100)
NRBC # BLD: 0 /100 WBCS — SIGNIFICANT CHANGE UP (ref 0–0)
PHOSPHATE SERPL-MCNC: 3.1 MG/DL — SIGNIFICANT CHANGE UP (ref 2.5–4.5)
PLATELET # BLD AUTO: 149 K/UL — LOW (ref 150–400)
POTASSIUM SERPL-MCNC: 4.2 MMOL/L — SIGNIFICANT CHANGE UP (ref 3.5–5.3)
POTASSIUM SERPL-SCNC: 4.2 MMOL/L — SIGNIFICANT CHANGE UP (ref 3.5–5.3)
PROT SERPL-MCNC: 6.3 G/DL — SIGNIFICANT CHANGE UP (ref 6–8.3)
RBC # BLD: 4.56 M/UL — SIGNIFICANT CHANGE UP (ref 3.8–5.2)
RBC # FLD: 14.4 % — SIGNIFICANT CHANGE UP (ref 10.3–14.5)
SODIUM SERPL-SCNC: 138 MMOL/L — SIGNIFICANT CHANGE UP (ref 135–145)
WBC # BLD: 4.75 K/UL — SIGNIFICANT CHANGE UP (ref 3.8–10.5)
WBC # FLD AUTO: 4.75 K/UL — SIGNIFICANT CHANGE UP (ref 3.8–10.5)

## 2023-01-06 PROCEDURE — C1874: CPT

## 2023-01-06 PROCEDURE — C9399: CPT

## 2023-01-06 PROCEDURE — 82803 BLOOD GASES ANY COMBINATION: CPT

## 2023-01-06 PROCEDURE — 83690 ASSAY OF LIPASE: CPT

## 2023-01-06 PROCEDURE — 74330 X-RAY BILE/PANC ENDOSCOPY: CPT

## 2023-01-06 PROCEDURE — 83036 HEMOGLOBIN GLYCOSYLATED A1C: CPT

## 2023-01-06 PROCEDURE — 84132 ASSAY OF SERUM POTASSIUM: CPT

## 2023-01-06 PROCEDURE — 87637 SARSCOV2&INF A&B&RSV AMP PRB: CPT

## 2023-01-06 PROCEDURE — 71045 X-RAY EXAM CHEST 1 VIEW: CPT

## 2023-01-06 PROCEDURE — 82247 BILIRUBIN TOTAL: CPT

## 2023-01-06 PROCEDURE — 85025 COMPLETE CBC W/AUTO DIFF WBC: CPT

## 2023-01-06 PROCEDURE — 82248 BILIRUBIN DIRECT: CPT

## 2023-01-06 PROCEDURE — 99285 EMERGENCY DEPT VISIT HI MDM: CPT

## 2023-01-06 PROCEDURE — 76705 ECHO EXAM OF ABDOMEN: CPT

## 2023-01-06 PROCEDURE — 82330 ASSAY OF CALCIUM: CPT

## 2023-01-06 PROCEDURE — 97161 PT EVAL LOW COMPLEX 20 MIN: CPT

## 2023-01-06 PROCEDURE — 84295 ASSAY OF SERUM SODIUM: CPT

## 2023-01-06 PROCEDURE — 83735 ASSAY OF MAGNESIUM: CPT

## 2023-01-06 PROCEDURE — 85014 HEMATOCRIT: CPT

## 2023-01-06 PROCEDURE — 84100 ASSAY OF PHOSPHORUS: CPT

## 2023-01-06 PROCEDURE — 82435 ASSAY OF BLOOD CHLORIDE: CPT

## 2023-01-06 PROCEDURE — 82947 ASSAY GLUCOSE BLOOD QUANT: CPT

## 2023-01-06 PROCEDURE — 96374 THER/PROPH/DIAG INJ IV PUSH: CPT

## 2023-01-06 PROCEDURE — 85027 COMPLETE CBC AUTOMATED: CPT

## 2023-01-06 PROCEDURE — C1769: CPT

## 2023-01-06 PROCEDURE — 80053 COMPREHEN METABOLIC PANEL: CPT

## 2023-01-06 PROCEDURE — 85610 PROTHROMBIN TIME: CPT

## 2023-01-06 PROCEDURE — 93005 ELECTROCARDIOGRAM TRACING: CPT

## 2023-01-06 PROCEDURE — 85018 HEMOGLOBIN: CPT

## 2023-01-06 PROCEDURE — 83605 ASSAY OF LACTIC ACID: CPT

## 2023-01-06 PROCEDURE — 96375 TX/PRO/DX INJ NEW DRUG ADDON: CPT

## 2023-01-06 PROCEDURE — 82962 GLUCOSE BLOOD TEST: CPT

## 2023-01-06 PROCEDURE — 85730 THROMBOPLASTIN TIME PARTIAL: CPT

## 2023-01-06 RX ORDER — RIVAROXABAN 15 MG-20MG
1 KIT ORAL
Qty: 0 | Refills: 0 | DISCHARGE

## 2023-01-06 RX ADMIN — PANTOPRAZOLE SODIUM 40 MILLIGRAM(S): 20 TABLET, DELAYED RELEASE ORAL at 11:24

## 2023-01-06 RX ADMIN — PIPERACILLIN AND TAZOBACTAM 25 GRAM(S): 4; .5 INJECTION, POWDER, LYOPHILIZED, FOR SOLUTION INTRAVENOUS at 05:16

## 2023-01-06 RX ADMIN — Medication 1: at 08:50

## 2023-01-06 RX ADMIN — Medication 5 MILLIGRAM(S): at 11:24

## 2023-01-06 NOTE — PROGRESS NOTE ADULT - SUBJECTIVE AND OBJECTIVE BOX
EP Attending    HISTORY OF PRESENT ILLNESS: HPI:  89yo F h/o HTN, hypothyroid, Afib (on Xarelto), PPM (for tachy-inocencia syndrome) presenting to ER for elevated LFTs. Pt was having an outpt workup last week and found to have gallstones. Pt then got lab work in anticipation of seeing her cardiologist for a separate issue (increased dyspnea on exertion) - her lab work was significant for elevated LFTs, so her cardiologist sent her in to the ER. (04 Jan 2023 16:13)    Ms Taveras is a very pleasant 89yo woman who presents to the hospital for shortness of breath on exertion only, as well as some abdominal discomfort.  She is pending an ERCP to work up gallstone disease.  There is the clinical question of whether or not her pacemaker is optimized for her level of activity.  She walks without any mechanical aids, and does not fall or faint at home. She feels fine at rest, but with walking on flat ground can feel easily fatigued.  No palpitations.  While she is not aware of a history of AFib, it is in her chart history and she is prescribed Xarelto.    Her pulse generator is a MEDTRONIC dual chamber, but her pacing lead is a BIOTRONIK VDD lead with a hanging atrial bipole for atrial sensing only.  Per Medtronic reps, she is programmed to DDD mode but with the atrial output turned down.  No angina, no orthopnea. A 10 pt ROS is otherwise negative.  Date of service 1/6- ERCP went well yesterday. Stent placed, no stone extraction due to recent use of Xarelto.  Pacemaker was checked and is in good working order.  She feels well today.  Still SOB on exertion.    PAST MEDICAL & SURGICAL HISTORY:  Asthma  Pneumonia  11/10  Hyperlipidemia  Osteoporosis  Hypertension  Hypothyroid  Osteoporosis  Lumbar Spinal Stenosis  Lumbosacral Neuritis  Female Stress Incontinence  Status Post Total Knee Replacement  bilateral 2006, 2007    dextrose 5%. 1000 milliLiter(s) IV Continuous <Continuous>  dextrose 5%. 1000 milliLiter(s) IV Continuous <Continuous>  dextrose 50% Injectable 25 Gram(s) IV Push once  dextrose 50% Injectable 12.5 Gram(s) IV Push once  dextrose 50% Injectable 25 Gram(s) IV Push once  dextrose Oral Gel 15 Gram(s) Oral once PRN  enoxaparin Injectable 30 milliGRAM(s) SubCutaneous every 24 hours  glucagon  Injectable 1 milliGRAM(s) IntraMuscular once  insulin lispro (ADMELOG) corrective regimen sliding scale   SubCutaneous three times a day before meals  insulin lispro (ADMELOG) corrective regimen sliding scale   SubCutaneous at bedtime  levothyroxine Injectable 90 MICROGram(s) IV Push at bedtime  metoprolol tartrate Injectable 5 milliGRAM(s) IV Push every 6 hours  pantoprazole  Injectable 40 milliGRAM(s) IV Push daily  piperacillin/tazobactam IVPB.. 3.375 Gram(s) IV Intermittent every 8 hours                            11.2   4.75  )-----------( 149      ( 06 Jan 2023 06:44 )             36.2       01-06    138  |  107  |  18  ----------------------------<  169<H>  4.2   |  21<L>  |  1.23    Ca    8.1<L>      06 Jan 2023 06:44  Phos  3.1     01-06  Mg     1.9     01-06    TPro  6.3  /  Alb  3.2<L>  /  TBili  1.0  /  DBili  x   /  AST  32  /  ALT  141<H>  /  AlkPhos  93  01-06    T(C): 36.4 (01-06-23 @ 09:33), Max: 36.9 (01-05-23 @ 16:40)  HR: 67 (01-06-23 @ 11:43) (55 - 72)  BP: 122/92 (01-06-23 @ 11:43) (120/57 - 166/79)  RR: 18 (01-06-23 @ 09:33) (18 - 23)  SpO2: 97% (01-06-23 @ 11:43) (96% - 99%)  Wt(kg): --    I&O's Summary    05 Jan 2023 07:01  -  06 Jan 2023 07:00  --------------------------------------------------------  IN: 1340 mL / OUT: 1050 mL / NET: 290 mL    Appearance: Well appearing adult woman who appears younger than stated age.	  HEENT:   Normal oral mucosa, PERRL, EOMI	  Lymphatic: No lymphadenopathy , no edema  Cardiovascular: Normal S1 S2, No JVD, No murmurs , Peripheral pulses palpable 2+ bilaterally, pacemaker in left upper chest.  Respiratory: Lungs clear to auscultation, normal effort 	  Gastrointestinal:  Soft, Non-tender, + BS	  Skin: No rashes, No ecchymoses, No cyanosis, warm to touch  Musculoskeletal: Normal range of motion, normal strength  Psychiatry:  Mood & affect appropriate    TELEMETRY: none	    ECG:  In 2021, NSR with narrow complex QRS.	  Echo: < from: TTE with Doppler (w/Cont) (03.17.21 @ 14:22) >  Dimensions:    Normal Values:  LA:     3.8    2.0 - 4.0 cm  Ao:     3.3    2.0 - 3.8 cm  SEPTUM: 0.9    0.6 - 1.2 cm  PWT:    1.0    0.6 - 1.1 cm  LVIDd:  3.6    3.0 - 5.6 cm  LVIDs:  2.2    1.8 - 4.0 cm  Derived variables:  LVMI: 56 g/m2  RWT: 0.55  EF (Visual Estimate): 70 %  Doppler Peak Velocity (m/sec): AoV=2.0 TV=2.0  ------------------------------------------------------------------------  Observations:  Mitral Valve: Normal mitral valve. Mitral annular  calcification.  Aortic Valve/Aorta: Fibrocalcific aortic valve without  stenosis.  .  Normal aortic root.  Left Atrium: Normal left atrium.  Left Ventricle: Endocardial visualization enhanced with  intravenous injection of Ultrasonic Enhancing Agent  (Definity).  Normal left ventricular internal dimensions and wall  thicknesses.  Normal left ventricular systolic function. No segmental  wall motion abnormalities.  Impaired LV-relaxation with normal filling pressure.  Right Heart: Normal right atrium. Normal right ventricular  size and function.  Normal tricuspid valve. Minimal tricuspid regurgitation.  Normal pulmonic valve.  Pericardium/Pleura: Normal pericardium with no pericardial  effusion.  Hemodynamic: Estimated right atrial pressue is normal.  No evidence of pulmonary hypertension.    < end of copied text >      ASSESSMENT/PLAN: 	Ms Taveras is a very pleasant 89y Female being seen for shortness of breath.  She is in sinus rhythm.  Tolerated ERCP with stent placement well.  Awaiting outpatient ERCP for stent removal, and also awaiting timing for laparoscopic cholecystectomy (timing / location TBD).  Her pacemaker was checked yesterday:  Pacemaker check shows her device in good working order.  Battery life is >13yrs.  Ventricular pacing threshold is <1V @ 0.4ms.  She is in sinus rhythm with adequate sensing.  The heartrate histogram shows a normal distribution, suggesting that she does not have chronotropic incompetence.  She has short bursts of paroxysmal AFib.  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  There is no indication to change to a 'rate response' mode, as we cannot pace the right atrium with this type of pacing lead.    Resume oral metoprolol.  Repeat an echocardiogram re: exertional shortness of breath, last one was in 2021.  OK to hold Xarelto while timing of lap-choley is being addressed.  If this is to be done as inpatient, recommend a heparin infusion or treatment dose lovenox until the evening before surgery, and we would resume Xarelto before discharge home.  If lap-choley to be done as outpatient, would stop Xarelto 3 nights before coming in for surgery (without bridging at home).  From an EP perspective she can go to surgery without any pacemaker adjustments. She is not PPM dependent.  No magnet over device is needed.    Sebastian Krause M.D.  Cardiac Electrophysiology    office [phone]  pager [phone]

## 2023-01-06 NOTE — DISCHARGE NOTE NURSING/CASE MANAGEMENT/SOCIAL WORK - PATIENT PORTAL LINK FT
You can access the FollowMyHealth Patient Portal offered by [facility] by registering at the following website: [email or web address]. By joining [facility]’s FollowMyHealth portal, you will also be able to view your health information using other applications (apps) compatible with our system.

## 2023-01-06 NOTE — DISCHARGE NOTE PROVIDER - HOSPITAL COURSE
89yo F h/o HTN, hypothyroid, Afib (on Xarelto), PPM (for tachy-inocencia syndrome) presenting to ER for elevated LFTs. Pt was having an outpt workup last week and found to have gallstones. Pt then got lab work in anticipation of seeing her cardiologist for a separate issue (increased dyspnea on exertion) - her lab work was significant for elevated LFTs, so her cardiologist sent her in to the ER.     Patient underwent abdominal ultrasound which demonstrated cholelithiasis and probable choledocholithiasis with biliary dilatation. Gastroenterology consulted. patient taken for ERCP.  Patient was found to have dilated CBD with filling defects c/f bile duct stones or sludge. Pus in duct concerning for cholangitis. FCSEMS placed for drainage as well as to break up stones. Patient to undergo repeat ERCP in 6-8 weeks as an outpatient for stent and stone removal. Diet was advanced as tolerated. On day of discharge, the patient was tolerating diet, ambulating well and pain controlled. Patient sent home on 7 days of antibiotics. Patient to follow up with Dr. Alonso, surgeon in 1-2 weeks and Dr. Mccauley, gastroenterologist.

## 2023-01-06 NOTE — PROGRESS NOTE ADULT - ASSESSMENT
89F h/o HTN, hypothyroid, Afib (on Xarelto), PPM (for tachy-inocencia syndrome) presenting to ER for elevated LFTs and c/f choledocholithiasis.       Impression:   #Cholelithiasis  #Elevated liver enzymes  #C/f choledocholithiasis  pt currently asymptomatic with Tbili elevated to 1.6, US RUQ suspicious for choledocholithiasis + CBD dilation to 8mm. Currently afebrile, HD stable without leukocytosis, does not meet clinical criteria for cholangitis at this time    #Afib: on xarelto, last taken 1/3/23      Recommendations:   - hold xarelto for 48hrs from ERCP if able to dec risk of post sphincterotomy bleeding  - c/w ab for cholangitis for 7-10 days  - cholecystectomy per surgery  - advance diet as tolerated from GI perspective  - repeat ERCP in 6-8 weeks for stent removal    Note not finalized until signed by attending.    Cathryn Glez PGY-6  Gastroenterology/Hepatology Fellow  Pager #[phone] (AMBER) or [phone] (NS)  Available on [employer] Teams.  Please contact on-call GI fellow via answering service ([phone]) after 5pm and before 8am, and on weekends.

## 2023-01-06 NOTE — DISCHARGE NOTE PROVIDER - CARE PROVIDER_API CALL
Harshad Alonso)  Surgery  [address], [address]  [place] [address]  Phone: [phone]  Fax: [phone]  Follow Up Time: 2 weeks    Dede Lawton)  Gastroenterology; Internal Medicine  [address] [place] [address], [address]  [place], [place] [id number]  Phone: [phone]  Fax: [phone]  Follow Up Time: 2 weeks

## 2023-01-06 NOTE — DISCHARGE NOTE PROVIDER - NSDCMRMEDTOKEN_GEN_ALL_CORE_FT
amoxicillin-clavulanate 875 mg-125 mg oral tablet: 1 tab(s) orally 2 times a day   Jardiance 10 mg oral tablet: 1 tab(s) orally once a day (in the morning)  levothyroxine 112 mcg (0.112 mg) oral tablet: 1 tab(s) orally once a day  Metoprolol Succinate ER 25 mg oral tablet, extended release: 1 tab(s) orally once a day  rosuvastatin 10 mg oral tablet: 1 tab(s) orally once a day  Xarelto 15 mg oral tablet: 1 tab(s) orally once a day    PLEASE RESUME Xarelto tomorrow. 1/7

## 2023-01-06 NOTE — PROGRESS NOTE ADULT - SUBJECTIVE AND OBJECTIVE BOX
C A R D I O L O G Y  **********************************     DATE OF SERVICE: 01-06-23    Patient denies chest pain or shortness of breath. She ambulated with PT and had no HOU.  Review of systems otherwise negative.  	  MEDICATIONS:  MEDICATIONS  (STANDING):  dextrose 5%. 1000 milliLiter(s) (100 mL/Hr) IV Continuous <Continuous>  dextrose 5%. 1000 milliLiter(s) (50 mL/Hr) IV Continuous <Continuous>  dextrose 50% Injectable 25 Gram(s) IV Push once  dextrose 50% Injectable 12.5 Gram(s) IV Push once  dextrose 50% Injectable 25 Gram(s) IV Push once  enoxaparin Injectable 30 milliGRAM(s) SubCutaneous every 24 hours  glucagon  Injectable 1 milliGRAM(s) IntraMuscular once  insulin lispro (ADMELOG) corrective regimen sliding scale   SubCutaneous three times a day before meals  insulin lispro (ADMELOG) corrective regimen sliding scale   SubCutaneous at bedtime  levothyroxine Injectable 90 MICROGram(s) IV Push at bedtime  metoprolol tartrate Injectable 5 milliGRAM(s) IV Push every 6 hours  pantoprazole  Injectable 40 milliGRAM(s) IV Push daily  piperacillin/tazobactam IVPB.. 3.375 Gram(s) IV Intermittent every 8 hours      LABS:	 	    CARDIAC MARKERS:                                11.2   4.75  )-----------( 149      ( 06 Jan 2023 06:44 )             36.2     Hemoglobin: 11.2 g/dL (01-06 @ 06:44)  Hemoglobin: 11.3 g/dL (01-05 @ 06:37)  Hemoglobin: 11.6 g/dL (01-04 @ 14:41)      01-06    138  |  107  |  18  ----------------------------<  169<H>  4.2   |  21<L>  |  1.23    Ca    8.1<L>      06 Jan 2023 06:44  Phos  3.1     01-06  Mg     1.9     01-06    TPro  6.3  /  Alb  3.2<L>  /  TBili  1.0  /  DBili  x   /  AST  32  /  ALT  141<H>  /  AlkPhos  93  01-06    Creatinine Trend: 1.23<--, 1.45<--, 1.69<--    COAGS:       proBNP:   Lipid Profile:   HgA1c:   TSH:       PHYSICAL EXAM:  T(C): 36.4 (01-06-23 @ 09:33), Max: 36.9 (01-05-23 @ 16:40)  HR: 67 (01-06-23 @ 11:43) (55 - 72)  BP: 122/92 (01-06-23 @ 11:43) (120/57 - 166/79)  RR: 18 (01-06-23 @ 09:33) (18 - 23)  SpO2: 97% (01-06-23 @ 11:43) (96% - 99%)  Wt(kg): --  I&O's Summary    05 Jan 2023 07:01  -  06 Jan 2023 07:00  --------------------------------------------------------  IN: 1340 mL / OUT: 1050 mL / NET: 290 mL      Gen: Appears well in NAD  HEENT:  (-)icterus (-)pallor  CV: N S1 S2 1/6 ALEX (+)2 Pulses B/l  Resp:  Clear to auscultation B/L, normal effort  GI: (+) BS Soft, NT, ND  Lymph:  (-)Edema, (-)obvious lymphadenopathy  Skin: Warm to touch, Normal turgor  Psych: Appropriate mood and affect      TELEMETRY: None	      < from: Xray Chest 1 View- PORTABLE-Routine (Xray Chest 1 View- PORTABLE-Routine .) (01.05.23 @ 16:48) >  IMPRESSION:  Small right pleural effusion.  Trace left pleural effusion and/or basilar atelectasis.    --- End of Report ---    < end of copied text >      ASSESSMENT/PLAN: Patient is a 89 y/o female with PMH of HTN, HLD, DM, hypothyroid, PAF (on Xarelto), and PPM (for tachy-inocencia syndrome) who is admitted with elevated LFTs. Patient also being worked up by outpatient cardiologist for HOU. Cardiology consulted for further evaluation.    #Elevated LFTs  - GI/Surgery follow up - plan for eventual cholecystectomy  - s/p ERCP 1/5 noted: Dilated CBD with filling defects c/f bile duct stones or sludge. Pus in duct concerning for cholangitis. FCSEMS placed for drainage as well as to break up stones.  - Tolerated procedure well from CV perspective  - Based on patient's RCRI score of 1 (DM), would consider her low cardiac risk for cholecystectomy. Patient is optimized from CV perspective to proceed. No further cardiac testing needed.    #HOU  - Unclear etiology currently resolved  - CXR only with small/trace pleural effusion - ambulated with PT on room air without hypoxia  - EP consult appreciated - PPM check with no issues. Does not have chronotropic incompetence  - Had recent normal stress test and echo with normal LV function in 12/2022 per outpatient cardiologist    #PAF  - Resume Xarelto s/p ERCP when ok with GI  - Currently on IV lopressor - resume home PO metoprolol when tolerating PO    - No further inpatient cardiac w/u planned  - Patient to f/u with her cardiologist Dr. Diaz Goddard after discharge    Presley Britt PA-C  Pager: [phone]

## 2023-01-06 NOTE — PROGRESS NOTE ADULT - SUBJECTIVE AND OBJECTIVE BOX
Chief Complaint:  Patient is a 89y old  Female who presents with a chief complaint of Elevated LFTs (06 Jan 2023 07:25)      Interval Events: s/p ERCP yesterday with stent placement for cholangitis  - feels well today, afebrile, no abd pain, n/v  - reports she is hungry      Hospital Medications:  dextrose 5%. 1000 milliLiter(s) IV Continuous <Continuous>  dextrose 5%. 1000 milliLiter(s) IV Continuous <Continuous>  dextrose 50% Injectable 25 Gram(s) IV Push once  dextrose 50% Injectable 12.5 Gram(s) IV Push once  dextrose 50% Injectable 25 Gram(s) IV Push once  dextrose Oral Gel 15 Gram(s) Oral once PRN  enoxaparin Injectable 30 milliGRAM(s) SubCutaneous every 24 hours  glucagon  Injectable 1 milliGRAM(s) IntraMuscular once  insulin lispro (ADMELOG) corrective regimen sliding scale   SubCutaneous three times a day before meals  insulin lispro (ADMELOG) corrective regimen sliding scale   SubCutaneous at bedtime  levothyroxine Injectable 90 MICROGram(s) IV Push at bedtime  metoprolol tartrate Injectable 5 milliGRAM(s) IV Push every 6 hours  pantoprazole  Injectable 40 milliGRAM(s) IV Push daily  piperacillin/tazobactam IVPB.. 3.375 Gram(s) IV Intermittent every 8 hours      PMHX/PSHX:  Asthma    Pneumonia    Hyperlipidemia    Osteoporosis    Hypertension    Hypothyroid    Osteoporosis    Lumbar Spinal Stenosis    Lumbosacral Neuritis    Female Stress Incontinence    Status Post Total Knee Replacement            ROS:     General:  No weight loss, fevers, chills, night sweats, fatigue   Eyes:  No vision changes  ENT:  No sore throat, pain, runny nose  CV:  No chest pain, palpitations, dizziness   Resp:  No SOB, cough, wheezing  GI:  See HPI  :  No burning with urination, hematuria  Muscle:  No pain, weakness  Neuro:  No weakness/tingling, memory problems  Psych:  No fatigue, insomnia, mood problems, depression  Heme:  No easy bruisability  Skin:  No rash, edema      PHYSICAL EXAM:     GENERAL:  Well developed, no distress  HEENT:  NC/AT,  conjunctivae clear, sclera anicteric  CHEST:  Full & symmetric excursion, no increased effort w/ respirations  HEART:  Regular rhythm & rate  ABDOMEN:  Soft, non-tender, non-distended  EXTREMITIES:  no LE  edema  SKIN:  No rash/erythema/ecchymoses/petechiae/wounds/jaundice  NEURO:  Alert, oriented    Vital Signs:  Vital Signs Last 24 Hrs  T(C): 36.4 (06 Jan 2023 09:33), Max: 36.9 (05 Jan 2023 16:40)  T(F): 97.5 (06 Jan 2023 09:33), Max: 98.5 (05 Jan 2023 16:40)  HR: 64 (06 Jan 2023 09:33) (55 - 72)  BP: 148/65 (06 Jan 2023 09:33) (113/91 - 166/79)  BP(mean): --  RR: 18 (06 Jan 2023 09:33) (18 - 23)  SpO2: 96% (06 Jan 2023 09:33) (96% - 99%)    Parameters below as of 06 Jan 2023 09:33  Patient On (Oxygen Delivery Method): room air      Daily     Daily     LABS:                        11.2   4.75  )-----------( 149      ( 06 Jan 2023 06:44 )             36.2     01-06    138  |  107  |  18  ----------------------------<  169<H>  4.2   |  21<L>  |  1.23    Ca    8.1<L>      06 Jan 2023 06:44  Phos  3.1     01-06  Mg     1.9     01-06    TPro  6.3  /  Alb  3.2<L>  /  TBili  1.0  /  DBili  x   /  AST  32  /  ALT  141<H>  /  AlkPhos  93  01-06    LIVER FUNCTIONS - ( 06 Jan 2023 06:44 )  Alb: 3.2 g/dL / Pro: 6.3 g/dL / ALK PHOS: 93 U/L / ALT: 141 U/L / AST: 32 U/L / GGT: x           PT/INR - ( 04 Jan 2023 14:42 )   PT: 17.1 sec;   INR: 1.48 ratio         PTT - ( 04 Jan 2023 14:42 )  PTT:26.3 sec        Imaging:    < from: ERCP (01.05.23 @ 13:23) >  Findings:       EGD:       The esophagus was tortuous and several esophageal diverticuli were noted distally.       There was a large paraesophageal hernia, Hill grade 4.   The gastric mucosa was erythematous.       The examined duodenum was normal.       ERCP:       The  film was normal.       The duodenosocpe was carefully passed to the 2nd portion of the duodenum to the major pailla.        There was a diverticulum near the major papilla.       The CBD was cannulated with a 3.9F sphinctertome over a 0.025in guidewire.       Contrast was injected with the sphinctertome and then an extraction balloon and fluoroscopic        images were obtained and personallyinterpreted by me. The bile duct was dilated to        approximately 12mm and in the mid CBD near the cystic duct insertion as well as in the distal        bile duct, there were several filling defects.       Due to concern for bleeding in setting ofXarelto, no sphincterotomy or stone extraction was        performed.       A [id number] fully covered metal Viabl biliary stent was deployed into the bile duct under        endoscopic and fluoroscopic guidance. It was in good position.       Clear green bile as well as pus and sludge flowed from the stent.                                                                                                        Impression:          - Tortuous esophagus with esophageal diverticuli and large paraesophageal                        hernia.                       - Gastritis.                       - Tanya-ampullary diverticulum.                       - Dilated CBD with filling defects c/f bile duct stones or sludge. Pus in                        duct concerning for cholangitis. FCSEMS placed for drainage as well as to                        break up stones.    < end of copied text >

## 2023-01-06 NOTE — PROGRESS NOTE ADULT - ASSESSMENT
89yo F h/o HTN, hypothyroid, Afib (on Xarelto), PPM (for tachy-inocencia syndrome) presenting to ER for elevated LFTs, found to have likely choledocholithiasis. S/P ERCP.     PLAN:  - Diet: CLD  - MATHEUS: Cr elevated to 1.69 (1-1.1 on previous admissions); IV hydration  - Abx: Zosyn  - Plan for cholecystectomy during this admission     Green Team, [id number] 89yo F h/o HTN, hypothyroid, Afib (on Xarelto), PPM (for tachy-inocencia syndrome) presenting to ER for elevated LFTs, found to have likely choledocholithiasis. S/P ERCP with a placement of metal stent on 1/5.     PLAN:  - Diet: Advance to LRD  - MATHEUS improving with hydration and PO intake  - Abx: Zosyn for cholangitis  - Appreciate GI recs    Green Team, [id number] 89yo F h/o HTN, hypothyroid, Afib (on Xarelto), PPM (for tachy-inocencia syndrome) presenting to ER for elevated LFTs, found to have likely choledocholithiasis. S/P ERCP with a placement of metal stent on 1/5.     PLAN:  - Diet: Advance to LRD  - MATHEUS improving with hydration and PO intake  - Abx: Zosyn for cholangitis  - Appreciate GI recs  - Appreciate Cardiology recs; Would appreciate risk stratification     Green Team, [id number]

## 2023-01-06 NOTE — DISCHARGE NOTE PROVIDER - NSDCCPCAREPLAN_GEN_ALL_CORE_FT
PRINCIPAL DISCHARGE DIAGNOSIS  Diagnosis: Choledocholithiasis  Assessment and Plan of Treatment: DIET: regular diet   NOTIFY YOUR SURGEON IF: You have any any fever (over 100.4 F) or chills, persistent nausea/vomiting with inability to tolerate food or liquids, persistent diarrhea, or if your pain is not controlled on your discharge pain medications.  FOLLOW-UP:  1. Please call to make a follow-up appointment within two week of discharge   2. Please follow up with your primary care physician in one week regarding your hospitalization.  You need to undergo repeat ERCP in 6-8 weeks as an outpatient for stent and stone removal.***************Please follow up with Dr. Castillo, gastroenterologist.          PRINCIPAL DISCHARGE DIAGNOSIS  Diagnosis: Choledocholithiasis  Assessment and Plan of Treatment: DIET: regular diet   NOTIFY YOUR SURGEON IF: You have any any fever (over 100.4 F) or chills, persistent nausea/vomiting with inability to tolerate food or liquids, persistent diarrhea, or if your pain is not controlled on your discharge pain medications.  FOLLOW-UP:  1. Please call to make a follow-up appointment within two week of discharge   2. Please follow up with your primary care physician in one week regarding your hospitalization.  You need to undergo repeat ERCP in 6-8 weeks as an outpatient for stent and stone removal.***************Please follow up with Dr. Castillo, gastroenterologist.   you will need your gallbladder removed in the future. Xarelto needs to be stopped 3 days before surgery  you may resume xarelto tomorrow 1/7

## 2023-01-06 NOTE — PROGRESS NOTE ADULT - SUBJECTIVE AND OBJECTIVE BOX
Surgery Progress Note    SUBJECTIVE: Pt seen and examined at bedside.     Vital Signs Last 24 Hrs  T(C): 36.7 (05 Jan 2023 20:29), Max: 36.9 (05 Jan 2023 16:40)  T(F): 98 (05 Jan 2023 20:29), Max: 98.5 (05 Jan 2023 16:40)  HR: 61 (05 Jan 2023 20:29) (55 - 72)  BP: 156/79 (05 Jan 2023 20:29) (94/64 - 166/79)  BP(mean): --  RR: 18 (05 Jan 2023 20:29) (18 - 23)  SpO2: 97% (05 Jan 2023 20:29) (96% - 99%)    Parameters below as of 05 Jan 2023 20:29  Patient On (Oxygen Delivery Method): room air      Physical Exam:  General Appearance: Appears well, NAD  Respiratory: No labored breathing  CV: Pulse regularly present  Abdomen: Soft, nontender     LABS:                        11.3   5.15  )-----------( 152      ( 05 Jan 2023 06:37 )             35.9     01-05    139  |  106  |  29<H>  ----------------------------<  84  4.2   |  19<L>  |  1.45<H>    Ca    8.7      05 Jan 2023 06:37  Phos  2.4     01-05  Mg     2.0     01-05    TPro  6.3  /  Alb  3.3  /  TBili  1.4<H>  /  DBili  x   /  AST  70<H>  /  ALT  207<H>  /  AlkPhos  111  01-05    PT/INR - ( 04 Jan 2023 14:42 )   PT: 17.1 sec;   INR: 1.48 ratio         PTT - ( 04 Jan 2023 14:42 )  PTT:26.3 sec      INs and OUTs:    01-04-23 @ 07:01  -  01-05-23 @ 07:00  --------------------------------------------------------  IN: 1485 mL / OUT: 600 mL / NET: 885 mL    01-05-23 @ 07:01  -  01-06-23 @ 00:30  --------------------------------------------------------  IN: 220 mL / OUT: 650 mL / NET: -430 mL     Surgery Progress Note    SUBJECTIVE: Pt seen and examined at bedside. Patient sitting in chair and awake. Tolerating clear liquid diet and would like to eat regular food. +/+    Vital Signs Last 24 Hrs  T(C): 36.7 (05 Jan 2023 20:29), Max: 36.9 (05 Jan 2023 16:40)  T(F): 98 (05 Jan 2023 20:29), Max: 98.5 (05 Jan 2023 16:40)  HR: 61 (05 Jan 2023 20:29) (55 - 72)  BP: 156/79 (05 Jan 2023 20:29) (94/64 - 166/79)  BP(mean): --  RR: 18 (05 Jan 2023 20:29) (18 - 23)  SpO2: 97% (05 Jan 2023 20:29) (96% - 99%)    Parameters below as of 05 Jan 2023 20:29  Patient On (Oxygen Delivery Method): room air      Physical Exam:  General Appearance: Appears well, NAD  Respiratory: No labored breathing  CV: Pulse regularly present  Abdomen: Soft, nontender, Nondistended. No pain to deep palpation in the RUQ      LABS:                        11.3   5.15  )-----------( 152      ( 05 Jan 2023 06:37 )             35.9     01-05    139  |  106  |  29<H>  ----------------------------<  84  4.2   |  19<L>  |  1.45<H>    Ca    8.7      05 Jan 2023 06:37  Phos  2.4     01-05  Mg     2.0     01-05    TPro  6.3  /  Alb  3.3  /  TBili  1.4<H>  /  DBili  x   /  AST  70<H>  /  ALT  207<H>  /  AlkPhos  111  01-05    PT/INR - ( 04 Jan 2023 14:42 )   PT: 17.1 sec;   INR: 1.48 ratio         PTT - ( 04 Jan 2023 14:42 )  PTT:26.3 sec      INs and OUTs:    01-04-23 @ 07:01  -  01-05-23 @ 07:00  --------------------------------------------------------  IN: 1485 mL / OUT: 600 mL / NET: 885 mL    01-05-23 @ 07:01  -  01-06-23 @ 00:30  --------------------------------------------------------  IN: 220 mL / OUT: 650 mL / NET: -430 mL     Surgery Progress Note    SUBJECTIVE: Pt seen and examined at bedside. Patient dressed, sitting in chair, and awake. Tolerating clear liquid diet and would like to eat regular food. +/+    Vital Signs Last 24 Hrs  T(C): 36.7 (05 Jan 2023 20:29), Max: 36.9 (05 Jan 2023 16:40)  T(F): 98 (05 Jan 2023 20:29), Max: 98.5 (05 Jan 2023 16:40)  HR: 61 (05 Jan 2023 20:29) (55 - 72)  BP: 156/79 (05 Jan 2023 20:29) (94/64 - 166/79)  BP(mean): --  RR: 18 (05 Jan 2023 20:29) (18 - 23)  SpO2: 97% (05 Jan 2023 20:29) (96% - 99%)    Parameters below as of 05 Jan 2023 20:29  Patient On (Oxygen Delivery Method): room air      Physical Exam:  General Appearance: Appears well, NAD  Respiratory: No labored breathing  CV: Pulse regularly present  Abdomen: Soft, nontender, Nondistended. No pain to deep palpation in the RUQ      LABS:                        11.3   5.15  )-----------( 152      ( 05 Jan 2023 06:37 )             35.9     01-05    139  |  106  |  29<H>  ----------------------------<  84  4.2   |  19<L>  |  1.45<H>    Ca    8.7      05 Jan 2023 06:37  Phos  2.4     01-05  Mg     2.0     01-05    TPro  6.3  /  Alb  3.3  /  TBili  1.4<H>  /  DBili  x   /  AST  70<H>  /  ALT  207<H>  /  AlkPhos  111  01-05    PT/INR - ( 04 Jan 2023 14:42 )   PT: 17.1 sec;   INR: 1.48 ratio         PTT - ( 04 Jan 2023 14:42 )  PTT:26.3 sec      INs and OUTs:    01-04-23 @ 07:01  -  01-05-23 @ 07:00  --------------------------------------------------------  IN: 1485 mL / OUT: 600 mL / NET: 885 mL    01-05-23 @ 07:01  -  01-06-23 @ 00:30  --------------------------------------------------------  IN: 220 mL / OUT: 650 mL / NET: -430 mL

## 2023-01-06 NOTE — PROGRESS NOTE ADULT - ATTENDING COMMENTS
GI advanced endoscopy attending:  Patient discharged prior to my visit on 1/6/23 in the evening, patient not interviewed or examined by me.  Agree with above

## 2023-01-06 NOTE — PROGRESS NOTE ADULT - ASSESSMENT
Patient seen and examined, agree with above assessment and plan as transcribed above.    - resume Xarelto as soon as ok with GI  - D/C planning per medical team    Moo Hills MD, [facility]  BEEPER [phone]   Patient seen and examined, agree with above assessment and plan as transcribed above.    - resume Xarelto as soon as ok with GI  - D/C planning per surgical team     Moo Hills MD, [facility]  BEEPER [phone]

## 2023-01-06 NOTE — DISCHARGE NOTE PROVIDER - PROVIDER TOKENS
PROVIDER:[TOKEN:[[id number]],FOLLOWUP:[2 weeks]],PROVIDER:[TOKEN:[[id number]],FOLLOWUP:[2 weeks]]

## 2023-01-18 ENCOUNTER — APPOINTMENT (OUTPATIENT)
Dept: GASTROENTEROLOGY | Facility: CLINIC | Age: 88
End: 2023-01-18
Payer: MEDICARE

## 2023-01-18 ENCOUNTER — OUTPATIENT (OUTPATIENT)
Dept: OUTPATIENT SERVICES | Facility: HOSPITAL | Age: 88
LOS: 1 days | End: 2023-01-18
Payer: MEDICARE

## 2023-01-18 ENCOUNTER — RESULT REVIEW (OUTPATIENT)
Age: 88
End: 2023-01-18

## 2023-01-18 VITALS
DIASTOLIC BLOOD PRESSURE: 81 MMHG | OXYGEN SATURATION: 98 % | BODY MASS INDEX: 28.68 KG/M2 | HEIGHT: 64 IN | TEMPERATURE: 97.1 F | SYSTOLIC BLOOD PRESSURE: 156 MMHG | HEART RATE: 72 BPM | WEIGHT: 168 LBS

## 2023-01-18 DIAGNOSIS — K80.50 CALCULUS OF BILE DUCT WITHOUT CHOLANGITIS OR CHOLECYSTITIS WITHOUT OBSTRUCTION: ICD-10-CM

## 2023-01-18 DIAGNOSIS — K80.50 CALCULUS OF BILE DUCT W/OUT CHOLANGITIS OR CHOLECYSTITIS W/OUT OBSTRUCTION: ICD-10-CM

## 2023-01-18 PROCEDURE — 99204 OFFICE O/P NEW MOD 45 MIN: CPT

## 2023-01-18 PROCEDURE — 74177 CT ABD & PELVIS W/CONTRAST: CPT | Mod: 26,MH

## 2023-01-18 PROCEDURE — 74177 CT ABD & PELVIS W/CONTRAST: CPT

## 2023-01-18 NOTE — HISTORY OF PRESENT ILLNESS
[FreeTextEntry1] : LEONARDO GRIFFITH is a 89 year old female with a history of HTN, hypothyroidism, afib on Xarelto, tachy-inocencia syndrome s/p PPM, choledocholithiasis/cholangitis\par \par She is presenting today for hospital followup\par In the hospital she underwent ERCP with metal stent placement for stone disease + cholangiitis in setting of Xarelto still in system (no sphincterotomy performed)\par \par She did ok and was discharged\par Was planned for outpatient surgical eval for CCY\par \par Today she reported having acute spasmic epigastric pain\par No nausea/vomiting/ fevers/ chills\par Unrelated to food intake\par She is wondering if this is from her stones\par \par Note - son requesting to have repeat ERCP and cholecystectomy scheduled together as it is difficult to get patient back and forth to appointments/ procedures\par \par

## 2023-01-18 NOTE — PHYSICAL EXAM

## 2023-01-18 NOTE — ASSESSMENT
[FreeTextEntry1] : LEONARDO GRIFFITH is a 89 year old female with a history of HTN, hypothyroidism, afib on Xarelto, tachy-inocencia syndrome s/p PPM, choledocholithiasis/cholangitis here for followup\par \par #choledocholithiasis s/p ERCP with stent placement due to Xarelto\par # Abdominal pain, epigstric, intermittent, unclear e tiology\par #afib/tachy-inocencia on Xarelto with PPM in place\par #Cholelithiasis \par \par Recs:\par - will check labs today as well as CT Abdomen to ensure no acute cholecystitis or clogged stent that would require admission and more urgent ERCP/cholecystectomy\par - if above ok, rec OTC therapy for abd pain such as pepto bismol or maalox\par - Will also plan for outpatient ERCP for stent and stone removal\par - Benefits and risks of the procedure (including but not limited to pain, infection, bleeding, perforation, injury to internal organs, missed lesions, IV site problems, risks of anesthesia, possible need for further procedures including emergency surgery) were explained to the patient. Alternatives to the procedure were discussed. The patient was agreeable to proceed.\par - will need PSTs\par - will try to coordinate ERCP with Dr. Alonso (perhaps planned post ERCP admission for lap brittany)\par

## 2023-01-19 LAB
ALBUMIN SERPL ELPH-MCNC: 4 G/DL
ALP BLD-CCNC: 73 U/L
ALT SERPL-CCNC: 20 U/L
ANION GAP SERPL CALC-SCNC: 14 MMOL/L
AST SERPL-CCNC: 22 U/L
BASOPHILS # BLD AUTO: 0.04 K/UL
BASOPHILS NFR BLD AUTO: 0.4 %
BILIRUB SERPL-MCNC: 0.8 MG/DL
BUN SERPL-MCNC: 17 MG/DL
CALCIUM SERPL-MCNC: 9.4 MG/DL
CHLORIDE SERPL-SCNC: 103 MMOL/L
CO2 SERPL-SCNC: 21 MMOL/L
CREAT SERPL-MCNC: 1.01 MG/DL
EGFR: 53 ML/MIN/1.73M2
EOSINOPHIL # BLD AUTO: 0.1 K/UL
EOSINOPHIL NFR BLD AUTO: 1.1 %
GLUCOSE SERPL-MCNC: 95 MG/DL
HCT VFR BLD CALC: 39.6 %
HGB BLD-MCNC: 12.3 G/DL
IMM GRANULOCYTES NFR BLD AUTO: 0.4 %
INR PPP: 1.13 RATIO
LYMPHOCYTES # BLD AUTO: 0.55 K/UL
LYMPHOCYTES NFR BLD AUTO: 6 %
MAN DIFF?: NORMAL
MCHC RBC-ENTMCNC: 24.7 PG
MCHC RBC-ENTMCNC: 31.1 GM/DL
MCV RBC AUTO: 79.5 FL
MONOCYTES # BLD AUTO: 0.89 K/UL
MONOCYTES NFR BLD AUTO: 9.8 %
NEUTROPHILS # BLD AUTO: 7.49 K/UL
NEUTROPHILS NFR BLD AUTO: 82.3 %
PLATELET # BLD AUTO: 242 K/UL
POTASSIUM SERPL-SCNC: 4 MMOL/L
PROT SERPL-MCNC: 6.7 G/DL
PT BLD: 13.3 SEC
RBC # BLD: 4.98 M/UL
RBC # FLD: 14.3 %
SODIUM SERPL-SCNC: 138 MMOL/L
WBC # FLD AUTO: 9.11 K/UL

## 2023-01-23 ENCOUNTER — OUTPATIENT (OUTPATIENT)
Dept: OUTPATIENT SERVICES | Facility: HOSPITAL | Age: 88
LOS: 1 days | End: 2023-01-23
Payer: MEDICARE

## 2023-01-23 VITALS
OXYGEN SATURATION: 99 % | TEMPERATURE: 97 F | HEIGHT: 62.6 IN | RESPIRATION RATE: 16 BRPM | DIASTOLIC BLOOD PRESSURE: 80 MMHG | SYSTOLIC BLOOD PRESSURE: 145 MMHG | WEIGHT: 164.91 LBS | HEART RATE: 92 BPM

## 2023-01-23 DIAGNOSIS — Z98.1 ARTHRODESIS STATUS: Chronic | ICD-10-CM

## 2023-01-23 DIAGNOSIS — Z98.890 OTHER SPECIFIED POSTPROCEDURAL STATES: Chronic | ICD-10-CM

## 2023-01-23 DIAGNOSIS — Z95.0 PRESENCE OF CARDIAC PACEMAKER: ICD-10-CM

## 2023-01-23 DIAGNOSIS — K80.70 CALCULUS OF GALLBLADDER AND BILE DUCT WITHOUT CHOLECYSTITIS WITHOUT OBSTRUCTION: ICD-10-CM

## 2023-01-23 DIAGNOSIS — K80.50 CALCULUS OF BILE DUCT WITHOUT CHOLANGITIS OR CHOLECYSTITIS WITHOUT OBSTRUCTION: ICD-10-CM

## 2023-01-23 DIAGNOSIS — I48.91 UNSPECIFIED ATRIAL FIBRILLATION: ICD-10-CM

## 2023-01-23 DIAGNOSIS — Z95.0 PRESENCE OF CARDIAC PACEMAKER: Chronic | ICD-10-CM

## 2023-01-23 LAB
ALBUMIN SERPL ELPH-MCNC: 4 G/DL — SIGNIFICANT CHANGE UP (ref 3.3–5)
ALP SERPL-CCNC: 83 U/L — SIGNIFICANT CHANGE UP (ref 40–120)
ALT FLD-CCNC: 23 U/L — SIGNIFICANT CHANGE UP (ref 10–45)
ANION GAP SERPL CALC-SCNC: 15 MMOL/L — SIGNIFICANT CHANGE UP (ref 5–17)
AST SERPL-CCNC: 25 U/L — SIGNIFICANT CHANGE UP (ref 10–40)
BILIRUB SERPL-MCNC: 0.8 MG/DL — SIGNIFICANT CHANGE UP (ref 0.2–1.2)
BUN SERPL-MCNC: 24 MG/DL — HIGH (ref 7–23)
CALCIUM SERPL-MCNC: 10.2 MG/DL — SIGNIFICANT CHANGE UP (ref 8.4–10.5)
CHLORIDE SERPL-SCNC: 102 MMOL/L — SIGNIFICANT CHANGE UP (ref 96–108)
CO2 SERPL-SCNC: 20 MMOL/L — LOW (ref 22–31)
CREAT SERPL-MCNC: 1.19 MG/DL — SIGNIFICANT CHANGE UP (ref 0.5–1.3)
EGFR: 43 ML/MIN/1.73M2 — LOW
GLUCOSE SERPL-MCNC: 196 MG/DL — HIGH (ref 70–99)
HCT VFR BLD CALC: 42.7 % — SIGNIFICANT CHANGE UP (ref 34.5–45)
HGB BLD-MCNC: 13.1 G/DL — SIGNIFICANT CHANGE UP (ref 11.5–15.5)
MCHC RBC-ENTMCNC: 24.7 PG — LOW (ref 27–34)
MCHC RBC-ENTMCNC: 30.7 GM/DL — LOW (ref 32–36)
MCV RBC AUTO: 80.6 FL — SIGNIFICANT CHANGE UP (ref 80–100)
NRBC # BLD: 0 /100 WBCS — SIGNIFICANT CHANGE UP (ref 0–0)
PLATELET # BLD AUTO: 255 K/UL — SIGNIFICANT CHANGE UP (ref 150–400)
POTASSIUM SERPL-MCNC: 5.4 MMOL/L — HIGH (ref 3.5–5.3)
POTASSIUM SERPL-SCNC: 5.4 MMOL/L — HIGH (ref 3.5–5.3)
PROT SERPL-MCNC: 7.4 G/DL — SIGNIFICANT CHANGE UP (ref 6–8.3)
RBC # BLD: 5.3 M/UL — HIGH (ref 3.8–5.2)
RBC # FLD: 14.4 % — SIGNIFICANT CHANGE UP (ref 10.3–14.5)
SODIUM SERPL-SCNC: 137 MMOL/L — SIGNIFICANT CHANGE UP (ref 135–145)
WBC # BLD: 6.61 K/UL — SIGNIFICANT CHANGE UP (ref 3.8–10.5)
WBC # FLD AUTO: 6.61 K/UL — SIGNIFICANT CHANGE UP (ref 3.8–10.5)

## 2023-01-23 PROCEDURE — G0463: CPT

## 2023-01-23 PROCEDURE — 85027 COMPLETE CBC AUTOMATED: CPT

## 2023-01-23 PROCEDURE — 80053 COMPREHEN METABOLIC PANEL: CPT

## 2023-01-23 RX ORDER — EMPAGLIFLOZIN 10 MG/1
1 TABLET, FILM COATED ORAL
Qty: 0 | Refills: 0 | DISCHARGE

## 2023-01-23 NOTE — H&P PST ADULT - FUNCTIONAL STATUS
pt drives, walks around home, sob with stairs/4-10 METS pt drives, walks around home,mild house chores and ADL's, sob with 1 flight stairs/4-10 METS

## 2023-01-23 NOTE — H&P PST ADULT - RESPIRATORY
normal/clear to auscultation bilaterally/no wheezes/no rales/no rhonchi normal/clear to auscultation bilaterally/no rales/no rhonchi/wheezes

## 2023-01-23 NOTE — H&P PST ADULT - MUSCULOSKELETAL
details… bilateral knee replacement/decreased ROM due to pain/decreased strength bilateral knee replacement/decreased ROM due to pain/back exam/decreased strength

## 2023-01-23 NOTE — H&P PST ADULT - NSICDXFAMILYHX_GEN_ALL_CORE_FT
FAMILY HISTORY:  Mother  Still living? No  FH: heart disease, Age at diagnosis: Age Unknown  FH: type 2 diabetes, Age at diagnosis: Age Unknown

## 2023-01-23 NOTE — H&P PST ADULT - LAST STRESS TEST
Anesthesia Evaluation     NPO Solid Status: > 8 hours  NPO Liquid Status: > 8 hours           Airway   Mallampati: II  Neck ROM: full  No difficulty expected  Dental      Pulmonary - negative pulmonary ROS and normal exam   Cardiovascular - negative cardio ROS and normal exam        Neuro/Psych- negative ROS  GI/Hepatic/Renal/Endo - negative ROS     Musculoskeletal (-) negative ROS    Abdominal  - normal exam   Substance History - negative use     OB/GYN negative ob/gyn ROS         Other          Other Comment: Elbow to the nose while playing basketball 6 days ago.                  Anesthesia Plan    ASA 1     general     intravenous induction   Anesthetic plan and risks discussed with patient.      
12/2022 (Normal)

## 2023-01-23 NOTE — H&P PST ADULT - NSANTHNECKRD_ENT_A_CORE
[facility]  Department of Emergency Medicine   ED  Encounter Note  Admit Date/RoomTime: 2021  2:25 AM  ED Room: 72/8747-R    NAME: Abran Edouard  : 1969  MRN: [id number]     Chief Complaint:  Shortness of Breath (sob x 1.5 hours.)    History of Present Illness          Abran Edouard is a 46 y.o. old male who presents to the emergency department for evaluation of chest pain and shortness of breath. He states that for the past few months, he has had chest pain and shortness of breath at night if he eats too close to bedtime. He states he ate a chicken sandwich pretty late tonight and tried to go to sleep. He felt a gas bubble in his epigastric region and developed pain in his chest which radiate down his left arm. He had associated shortness of breath. Symptoms lasted longer than usual and did not resolve and so he called EMS. He states he does have history of hypertension but takes his lisinopril sporadically. He has not seen his family doctor in over a year. He denies any prior cardiac disease. He takes Tums as needed but is not any daily antacids. Patient did receive full dose aspirin by EMS prior to arrival.     .  ROS   Pertinent positives and negatives are stated within HPI, all other systems reviewed and are negative. Past Medical History:  has a past medical history of Hypertension. Surgical History:  has no past surgical history on file. Social History:  reports that he has been smoking cigars. He has been smoking about 0.03 packs per day. He has never used smokeless tobacco. He reports that he does not drink alcohol or use drugs. Family History: family history includes Cancer in his paternal grandfather; Diabetes in his father and mother; High Blood Pressure in his brother and father; No Known Problems in his sister, sister, sister, and sister; Stroke in his father. Allergies: Patient has no known allergies.     Physical Exam   Oxygen Saturation Interpretation: Normal.        ED Triage Vitals [05/02/21 0226]   BP Temp Temp src Pulse Resp SpO2 Height Weight   (!) 202/107 97.5 °F (36.4 °C) -- 65 -- 95 % 5' 11\" (1.803 m) 242 lb (109.8 kg)         General Appearance/Constitutional:  Alert, development consistent with age, NAD. HEENT:  NC/NT. PERRLA. Airway patent. Neck:  Normal ROM. Supple. Respiratory:  Clear to auscultation and breath sounds equal.  CV:  Regular rate and rhythm, normal heart sounds, without pathological murmurs, ectopy, gallops, or rubs. Chest:  Symmetrical without visible rash or tenderness. GI:  Abdomen Soft, nontender, good bowel sounds. No firm or pulsatile mass. Back:  No costovertebral tenderness. Extremities: No tenderness or edema noted. Lymphatics: no lymphadenopathy noted  Integument:  Normal turgor. Warm, dry, without visible rash, unless noted elsewhere. Neurological:  Oriented. Motor functions intact.    Psychiatric:  Affect normal.    Lab / Imaging Results   (All laboratory and radiology results have been personally reviewed by myself)  Labs:  Results for orders placed or performed during the hospital encounter of 05/02/21   COVID-19, Rapid    Specimen: Nasopharyngeal Swab   Result Value Ref Range    SARS-CoV-2, NAAT Not Detected Not Detected   CBC Auto Differential   Result Value Ref Range    WBC 7.3 4.5 - 11.5 E9/L    RBC 4.92 3.80 - 5.80 E12/L    Hemoglobin 16.0 12.5 - 16.5 g/dL    Hematocrit 48.1 37.0 - 54.0 %    MCV 97.8 80.0 - 99.9 fL    MCH 32.5 26.0 - 35.0 pg    MCHC 33.3 32.0 - 34.5 %    RDW 12.9 11.5 - 15.0 fL    Platelets 459 437 - 915 E9/L    MPV 8.8 7.0 - 12.0 fL    Neutrophils % 61.2 43.0 - 80.0 %    Immature Granulocytes % 0.3 0.0 - 5.0 %    Lymphocytes % 28.9 20.0 - 42.0 %    Monocytes % 4.5 2.0 - 12.0 %    Eosinophils % 4.7 0.0 - 6.0 %    Basophils % 0.4 0.0 - 2.0 %    Neutrophils Absolute 4.48 1.80 - 7.30 E9/L    Immature Granulocytes # 0.02 E9/L    Lymphocytes Absolute 2.11 1.50 - 4.00 E9/L follow-up recommended. EKG #1:  Interpreted by emergency department physician unless otherwise noted. Time:  3:07    Rate: 75 bpm  Rhythm: Sinus rhythm. Interpretation: ST changes in V3 through V6  Comparison: There are new findings on today's ECG when compared to prior tracings. EKG #2:  Interpreted by emergency department physician unless otherwise noted. Time:  4:33    Rate: 71 bpm  Rhythm: Sinus rhythm. Interpretation: ST changes in V3 through V6  Comparison: There are new findings on today's ECG when compared to prior tracings. ED Course / Medical Decision Making     Medications   nitroGLYCERIN (NITROSTAT) SL tablet 0.4 mg (0.4 mg Sublingual Not Given 5/2/21 0347)   heparin (porcine) injection 8,780 Units (has no administration in time range)   heparin (porcine) injection 4,390 Units (has no administration in time range)   heparin 25,000 units in dextrose 5% 250 mL (premix) infusion (18 Units/kg/hr × 109.8 kg Intravenous New Bag 5/2/21 0440)   aspirin chewable tablet 324 mg (324 mg Oral Not Given 5/2/21 0434)   nitroGLYCERIN 50 mg in dextrose 5% 250 mL infusion (5 mcg/min Intravenous New Bag 5/2/21 0505)   pantoprazole (PROTONIX) injection 40 mg (40 mg Intravenous Given 5/2/21 0304)   aluminum & magnesium hydroxide-simethicone (MAALOX) 30 mL, lidocaine viscous hcl (XYLOCAINE) 5 mL (GI COCKTAIL) ( Oral Given 5/2/21 0304)   sterile water injection (10 mLs  Given 5/2/21 0304)   heparin (porcine) injection 8,780 Units (8,780 Units Intravenous Given 5/2/21 0437)   ticagrelor (BRILINTA) tablet 180 mg (180 mg Oral Given 5/2/21 0506)        Re-Evaluations:  5/2/21      Patients condition improved. Chest pain free after nitro. Consultations:             IP CONSULT TO INTERNAL MEDICINE    Procedures:   none    MDM: Patient presents to the ED for evaluation of chest pain which radiated down his left arm associated with shortness of breath. Initial EKG was concerning for ischemia.   Patient was No

## 2023-01-23 NOTE — H&P PST ADULT - PROBLEM SELECTOR PLAN 1
ERCP with Dr. Dede Lawton on 1/31/23  Pre-op instructions given, questions answered  covid-19 PCR scheduled 1/28 in [facility]

## 2023-01-23 NOTE — H&P PST ADULT - NS MD HP INPLANTS MED DEV
bilateral knee repalcement/Artificial joint/Pacemaker bilateral knee, instrumentation in lumbar spine/Artificial joint/Pacemaker

## 2023-01-23 NOTE — H&P PST ADULT - PROBLEM/PLAN-2
Time-based billing (NON-critical care) Time-based billing (NON-critical care) DISPLAY PLAN FREE TEXT

## 2023-01-23 NOTE — H&P PST ADULT - NSICDXPASTMEDICALHX_GEN_ALL_CORE_FT
PAST MEDICAL HISTORY:  Afib     Arthritis     Asthma     Bile duct stone     Female Stress Incontinence     Hyperlipidemia     Hypertension     Hypothyroid     Lumbar Spinal Stenosis     Lumbosacral Neuritis     Osteoporosis     Osteoporosis     Pneumonia 11/10    Poor historian

## 2023-01-23 NOTE — H&P PST ADULT - HISTORY OF PRESENT ILLNESS
89y.o. female with past medical history of Pacemaker (Medtronic), Afib(on Xarelto), HTN,  Asthma (? patient denies) Hypothyroidism, Lumbar Stenosis,  Arthritis S/p bilateral knee replacement, Lumbar Fusion. Poor historian.  presents to PST prior to scheduled ERCP with DR. Dede Lawton on 1/31. Pt recently hospitalized under went ERCP with metal stent placement for stone disease and Cholangitis.  Denies abdominal pain, Nausea, vomiting, changes in bowel habits, jaundice.    patient had covid >6months ago: symptoms loss of taste and smell (never regained back fully)  patient is fully Vaccinated    Denies any SOB, Dizziness, syncope, chest pain , palpitations, fevers or chills.

## 2023-01-23 NOTE — H&P PST ADULT - PROBLEM SELECTOR PLAN 3
Pulse Generator Medtronic dual Chamber/ pacing lead B[employer] Source VDD  EP Consulted and last interrogated on 1/5 (EP consult note on 1/6 in [facility] and physical chart)

## 2023-01-23 NOTE — H&P PST ADULT - NSICDXPASTSURGICALHX_GEN_ALL_CORE_FT
PAST SURGICAL HISTORY:  Pacemaker     S/P ERCP     S/P lumbar spinal fusion     Status Post Total Knee Replacement bilateral 2006, 2007

## 2023-01-23 NOTE — H&P PST ADULT - NSANTHOBSERVEDRD_ENT_A_CORE
PROCEDURES:  Resection, joint, acromioclavicular 11-Dec-2020 13:07:29 Left Jose Alfredo Patton  Repair of rotator cuff 11-Dec-2020 13:07:28 Left Jose Alfredo Patton  
No

## 2023-01-23 NOTE — H&P PST ADULT - CARDIOVASCULAR
details… Afib on xarelto, Pacemaker Afib on xarelto, Pacemaker/normal/regular rate and rhythm/S1 S2 present/no gallops/no rub/no murmur

## 2023-01-23 NOTE — H&P PST ADULT - NEGATIVE OPHTHALMOLOGIC SYMPTOMS
Yes no diplopia/no photophobia/no lacrimation L/no lacrimation R/no blurred vision L wears glasses/no diplopia/no photophobia/no lacrimation L/no lacrimation R/no blurred vision L

## 2023-01-26 ENCOUNTER — APPOINTMENT (OUTPATIENT)
Dept: SURGERY | Facility: CLINIC | Age: 88
End: 2023-01-26
Payer: MEDICARE

## 2023-01-26 VITALS
HEART RATE: 71 BPM | DIASTOLIC BLOOD PRESSURE: 78 MMHG | HEIGHT: 64 IN | OXYGEN SATURATION: 98 % | TEMPERATURE: 97.8 F | WEIGHT: 167 LBS | BODY MASS INDEX: 28.51 KG/M2 | RESPIRATION RATE: 18 BRPM | SYSTOLIC BLOOD PRESSURE: 158 MMHG

## 2023-01-26 PROCEDURE — 99024 POSTOP FOLLOW-UP VISIT: CPT

## 2023-01-26 PROCEDURE — 99204 OFFICE O/P NEW MOD 45 MIN: CPT

## 2023-01-26 RX ORDER — ROSUVASTATIN CALCIUM 10 MG/1
10 TABLET, FILM COATED ORAL
Refills: 0 | Status: ACTIVE | COMMUNITY

## 2023-01-26 RX ORDER — ACETAMINOPHEN 325 MG/1
TABLET, FILM COATED ORAL
Refills: 0 | Status: DISCONTINUED | COMMUNITY
End: 2023-01-26

## 2023-01-26 RX ORDER — METOPROLOL SUCCINATE 25 MG/1
25 TABLET, EXTENDED RELEASE ORAL
Refills: 0 | Status: ACTIVE | COMMUNITY

## 2023-01-26 RX ORDER — ATORVASTATIN CALCIUM 40 MG/1
40 TABLET, FILM COATED ORAL
Qty: 90 | Refills: 0 | Status: DISCONTINUED | COMMUNITY
Start: 2019-06-09 | End: 2023-01-26

## 2023-01-26 RX ORDER — BIOTIN 10 MG
TABLET ORAL
Refills: 0 | Status: DISCONTINUED | COMMUNITY
End: 2023-01-26

## 2023-01-26 RX ORDER — RIVAROXABAN 15 MG/1
15 TABLET, FILM COATED ORAL
Refills: 0 | Status: ACTIVE | COMMUNITY

## 2023-01-26 NOTE — PHYSICAL EXAM
[Normal Breath Sounds] : Normal breath sounds [Normal Heart Sounds] : normal heart sounds [No Rash or Lesion] : No rash or lesion [Calm] : calm [de-identified] : No distress [de-identified] : Sclera clear [de-identified] : Soft and nontender.  There are no masses and there is no Farooq's sign [de-identified] : No clubbing cyanosis or edema [de-identified] : Cranial nerves II through XII grossly intact

## 2023-01-26 NOTE — CONSULT LETTER
[Dear  ___] : Dear  [unfilled], [Consult Letter:] : I had the pleasure of evaluating your patient, [unfilled]. [Please see my note below.] : Please see my note below. [Consult Closing:] : Thank you very much for allowing me to participate in the care of this patient.  If you have any questions, please do not hesitate to contact me. [Sincerely,] : Sincerely, [FreeTextEntry3] : Harshad Del Valle MD, FACS\par [facility]\par [facility]\par [address]\par [place]  [address]\par Tel: [phone]\par Email: [email or web address] \par  [DrLauren  ___] : Dr. VO

## 2023-01-26 NOTE — HISTORY OF PRESENT ILLNESS
[de-identified] : Deloris Hugo is a 89yr. old female is here for follow up visit.\par \par H/o cholelithiasis - choledocholithiasis with biliary dilatation. ERCP on 1/5/23. FCSEMS placed for drainage as well as to break up stones. Patient was discharged home on oral antibiotics. [de-identified] : This 89-year-old was admitted to [facility] the beginning of January after the finding of an abnormal bilirubin.  She was admitted with cholangitis.  She underwent an ERCP and a stent was placed in the common bile duct.  The stones could not be removed at that setting and she is now scheduled for another ERCP at the end of the month to remove the stent and remaining stones.  Since her discharge from the hospital she denies having had any pain, fever, chills.  She is tolerating a regular diet and having normal bowel movements.

## 2023-01-26 NOTE — ASSESSMENT
[FreeTextEntry1] : This patient suffers from cholelithiasis and choledocholithiasis.  She is to undergo a repeat ERCP for removal of her stent and remaining common bile duct stones.  I will plan a laparoscopic cholecystectomy thereafter.  The procedure as well as risks(including, but not limited to bleeding, infection, injury to hollow viscus, injury to bile ducts), benefits (pain relief), and alternatives were explained to the patient.\par \par Please advise me the safety of general anesthesia for this very pleasant patient.  Given the fact that she had a previous right colectomy she is at increased risk for having to undergo an open cholecystectomy.

## 2023-01-26 NOTE — DATA REVIEWED
[FreeTextEntry1] : I reviewed the images from the CAT scan on June January 18 which was consistent with pneumobilia, a thickened gallbladder wall, pericholecystic fluid, the presence of a stent in the common bile duct and an intra ventricular lead.  A sonogram performed on the same day was consistent with gallstones, no pericholecystic fluid and stones in the common bile duct.\par \par I also reviewed CBC, PT PTT, and liver function tests drawn on January 18 all of which were normal.

## 2023-01-27 DIAGNOSIS — K43.2 INCISIONAL HERNIA W/OUT OBSTRUCTION OR GANGRENE: ICD-10-CM

## 2023-01-27 PROBLEM — Z78.9 OTHER SPECIFIED HEALTH STATUS: Chronic | Status: ACTIVE | Noted: 2023-01-23

## 2023-01-27 PROBLEM — M19.90 UNSPECIFIED OSTEOARTHRITIS, UNSPECIFIED SITE: Chronic | Status: ACTIVE | Noted: 2023-01-23

## 2023-01-27 PROBLEM — I48.91 UNSPECIFIED ATRIAL FIBRILLATION: Chronic | Status: ACTIVE | Noted: 2023-01-23

## 2023-01-27 PROBLEM — K80.50 CALCULUS OF BILE DUCT WITHOUT CHOLANGITIS OR CHOLECYSTITIS WITHOUT OBSTRUCTION: Chronic | Status: ACTIVE | Noted: 2023-01-23

## 2023-01-30 LAB — SARS-COV-2 N GENE NPH QL NAA+PROBE: NOT DETECTED

## 2023-01-31 ENCOUNTER — TRANSCRIPTION ENCOUNTER (OUTPATIENT)
Age: 88
End: 2023-01-31

## 2023-01-31 ENCOUNTER — APPOINTMENT (OUTPATIENT)
Dept: GASTROENTEROLOGY | Facility: HOSPITAL | Age: 88
End: 2023-01-31

## 2023-01-31 ENCOUNTER — OUTPATIENT (OUTPATIENT)
Dept: OUTPATIENT SERVICES | Facility: HOSPITAL | Age: 88
LOS: 1 days | End: 2023-01-31
Payer: MEDICARE

## 2023-01-31 VITALS
HEART RATE: 55 BPM | DIASTOLIC BLOOD PRESSURE: 66 MMHG | OXYGEN SATURATION: 96 % | SYSTOLIC BLOOD PRESSURE: 137 MMHG | RESPIRATION RATE: 12 BRPM

## 2023-01-31 VITALS
SYSTOLIC BLOOD PRESSURE: 172 MMHG | HEART RATE: 60 BPM | TEMPERATURE: 100 F | DIASTOLIC BLOOD PRESSURE: 72 MMHG | OXYGEN SATURATION: 99 % | RESPIRATION RATE: 22 BRPM | WEIGHT: 166.89 LBS | HEIGHT: 66 IN

## 2023-01-31 DIAGNOSIS — Z98.1 ARTHRODESIS STATUS: Chronic | ICD-10-CM

## 2023-01-31 DIAGNOSIS — K80.50 CALCULUS OF BILE DUCT WITHOUT CHOLANGITIS OR CHOLECYSTITIS WITHOUT OBSTRUCTION: ICD-10-CM

## 2023-01-31 DIAGNOSIS — Z95.0 PRESENCE OF CARDIAC PACEMAKER: Chronic | ICD-10-CM

## 2023-01-31 DIAGNOSIS — Z98.890 OTHER SPECIFIED POSTPROCEDURAL STATES: Chronic | ICD-10-CM

## 2023-01-31 PROCEDURE — 43262 ENDO CHOLANGIOPANCREATOGRAPH: CPT

## 2023-01-31 PROCEDURE — 43275 ERCP REMOVE FORGN BODY DUCT: CPT

## 2023-01-31 PROCEDURE — 76000 FLUOROSCOPY <1 HR PHYS/QHP: CPT | Mod: 26,59

## 2023-01-31 PROCEDURE — 74330 X-RAY BILE/PANC ENDOSCOPY: CPT

## 2023-01-31 PROCEDURE — 43264 ERCP REMOVE DUCT CALCULI: CPT

## 2023-01-31 PROCEDURE — C9399: CPT

## 2023-01-31 PROCEDURE — C1769: CPT

## 2023-01-31 PROCEDURE — 43265 ERCP LITHOTRIPSY CALCULI: CPT

## 2023-01-31 DEVICE — GWIRE JAGTOME REVOLUTION RX 260CM/0.025IN: Type: IMPLANTABLE DEVICE | Status: FUNCTIONAL

## 2023-01-31 DEVICE — BLLN EXTRACT FUSION QUATRO 8.5 10 12 15MM: Type: IMPLANTABLE DEVICE | Status: FUNCTIONAL

## 2023-01-31 DEVICE — AUTOTOME CANNULATING SPHINCTEROTOME RX 44 20MM: Type: IMPLANTABLE DEVICE | Status: FUNCTIONAL

## 2023-01-31 DEVICE — CATH BLLN EXTRACT DIST GUIDE WIRE 15MM 3LUM: Type: IMPLANTABLE DEVICE | Status: FUNCTIONAL

## 2023-01-31 RX ORDER — INDOMETHACIN 50 MG
100 CAPSULE ORAL ONCE
Refills: 0 | Status: COMPLETED | OUTPATIENT
Start: 2023-01-31 | End: 2023-01-31

## 2023-01-31 RX ORDER — RIVAROXABAN 15 MG-20MG
1 KIT ORAL
Qty: 0 | Refills: 0 | DISCHARGE

## 2023-01-31 RX ORDER — CIPROFLOXACIN LACTATE 400MG/40ML
400 VIAL (ML) INTRAVENOUS ONCE
Refills: 0 | Status: DISCONTINUED | OUTPATIENT
Start: 2023-01-31 | End: 2023-02-14

## 2023-01-31 RX ORDER — DIPHENHYDRAMINE HCL 50 MG
50 CAPSULE ORAL ONCE
Refills: 0 | Status: COMPLETED | OUTPATIENT
Start: 2023-01-31 | End: 2023-01-31

## 2023-01-31 RX ADMIN — Medication 100 MILLIGRAM(S): at 10:40

## 2023-01-31 RX ADMIN — Medication 50 MILLIGRAM(S): at 10:40

## 2023-01-31 NOTE — ASU DISCHARGE PLAN (ADULT/PEDIATRIC) - NS MD DC FALL RISK RISK
For information on Fall & Injury Prevention, visit: [email or web address] OR  [email or web address] OR  https://www.cdc.gov/steadi/patient.html

## 2023-01-31 NOTE — ASU PATIENT PROFILE, ADULT - FALL HARM RISK - UNIVERSAL INTERVENTIONS
Bed in lowest position, wheels locked, appropriate side rails in place/Call bell, personal items and telephone in reach/Instruct patient to call for assistance before getting out of bed or chair/Non-slip footwear when patient is out of bed/[place] to call system/Physically safe environment - no spills, clutter or unnecessary equipment/Purposeful Proactive Rounding/Room/bathroom lighting operational, light cord in reach

## 2023-01-31 NOTE — PRE PROCEDURE NOTE - PRE PROCEDURE EVALUATION
Attending Physician: Jered                            Procedure: ERCP    Indication for Procedure: stent and stone removal  ________________________________________________________  PAST MEDICAL & SURGICAL HISTORY:  Asthma      Pneumonia  11/10      Hyperlipidemia      Osteoporosis      Hypertension      Hypothyroid      Osteoporosis      Lumbar Spinal Stenosis      Lumbosacral Neuritis      Female Stress Incontinence      Bile duct stone      Poor historian      Afib      Arthritis      Status Post Total Knee Replacement  bilateral 2006, 2007      Pacemaker      S/P lumbar spinal fusion      S/P ERCP        ALLERGIES:  No Known Allergies    HOME MEDICATIONS:  levothyroxine 112 mcg (0.112 mg) oral tablet: 1 tab(s) orally once a day  Metoprolol Succinate ER 25 mg oral tablet, extended release: 1 tab(s) orally once a day  rosuvastatin 10 mg oral tablet: 1 tab(s) orally once a day  Vitamin D3 125 mcg (5000 intl units) oral capsule: 1 cap(s) orally once a day  Xarelto 15 mg oral tablet: 1 tab(s) orally once a day    PLEASE RESUME Xarelto tomorrow. 1/7    AICD/PPM: [ ] yes   [ ] no    PERTINENT LAB DATA:                      PHYSICAL EXAMINATION:    Height (cm): 167.6  Weight (kg): 75.7  BMI (kg/m2): 26.9  BSA (m2): 1.85T(C): 38  HR: 60  BP: 172/72  RR: 22  SpO2: 99%    Constitutional: NAD  HEENT: PERRLA, EOMI,    Neck:  No JVD  Respiratory: CTAB/L  Cardiovascular: S1 and S2  Gastrointestinal: BS+, soft, NT/ND  Extremities: No peripheral edema  Neurological: A/O x 3, no focal deficits  Psychiatric: Normal mood, normal affect  Skin: No rashes    ASA Class: I [ ]  II [ ]  III [ ]  IV [ ]    COMMENTS:    The patient is a suitable candidate for the planned procedure unless box checked [ ]  No, explain:

## 2023-02-03 RX ORDER — CEFOTETAN DISODIUM 1 G
2 VIAL (EA) INJECTION ONCE
Refills: 0 | Status: DISCONTINUED | OUTPATIENT
Start: 2023-02-06 | End: 2023-02-06

## 2023-02-05 ENCOUNTER — TRANSCRIPTION ENCOUNTER (OUTPATIENT)
Age: 88
End: 2023-02-05

## 2023-02-06 ENCOUNTER — TRANSCRIPTION ENCOUNTER (OUTPATIENT)
Age: 88
End: 2023-02-06

## 2023-02-06 ENCOUNTER — RESULT REVIEW (OUTPATIENT)
Age: 88
End: 2023-02-06

## 2023-02-06 ENCOUNTER — APPOINTMENT (OUTPATIENT)
Dept: SURGERY | Facility: HOSPITAL | Age: 88
End: 2023-02-06
Payer: MEDICARE

## 2023-02-06 ENCOUNTER — OUTPATIENT (OUTPATIENT)
Dept: INPATIENT UNIT | Facility: HOSPITAL | Age: 88
LOS: 1 days | End: 2023-02-06
Payer: MEDICARE

## 2023-02-06 VITALS
TEMPERATURE: 98 F | HEIGHT: 62.6 IN | RESPIRATION RATE: 18 BRPM | OXYGEN SATURATION: 98 % | WEIGHT: 164.91 LBS | HEART RATE: 63 BPM | DIASTOLIC BLOOD PRESSURE: 83 MMHG | SYSTOLIC BLOOD PRESSURE: 159 MMHG

## 2023-02-06 DIAGNOSIS — Z98.1 ARTHRODESIS STATUS: Chronic | ICD-10-CM

## 2023-02-06 DIAGNOSIS — Z98.890 OTHER SPECIFIED POSTPROCEDURAL STATES: Chronic | ICD-10-CM

## 2023-02-06 DIAGNOSIS — K43.2 INCISIONAL HERNIA WITHOUT OBSTRUCTION OR GANGRENE: ICD-10-CM

## 2023-02-06 DIAGNOSIS — Z95.0 PRESENCE OF CARDIAC PACEMAKER: Chronic | ICD-10-CM

## 2023-02-06 DIAGNOSIS — K80.20 CALCULUS OF GALLBLADDER WITHOUT CHOLECYSTITIS WITHOUT OBSTRUCTION: ICD-10-CM

## 2023-02-06 LAB
BASE EXCESS BLDV CALC-SCNC: -2.2 MMOL/L — LOW (ref -2–3)
CA-I SERPL-SCNC: 1.36 MMOL/L — HIGH (ref 1.15–1.33)
CHLORIDE BLDV-SCNC: 106 MMOL/L — SIGNIFICANT CHANGE UP (ref 96–108)
CO2 BLDV-SCNC: 24 MMOL/L — SIGNIFICANT CHANGE UP (ref 22–26)
GAS PNL BLDV: 136 MMOL/L — SIGNIFICANT CHANGE UP (ref 136–145)
GAS PNL BLDV: SIGNIFICANT CHANGE UP
GAS PNL BLDV: SIGNIFICANT CHANGE UP
GLUCOSE BLDV-MCNC: 131 MG/DL — HIGH (ref 70–99)
HCO3 BLDV-SCNC: 23 MMOL/L — SIGNIFICANT CHANGE UP (ref 22–29)
HCT VFR BLDA CALC: 39 % — SIGNIFICANT CHANGE UP (ref 34.5–46.5)
HGB BLD CALC-MCNC: 12.9 G/DL — SIGNIFICANT CHANGE UP (ref 11.7–16.1)
LACTATE BLDV-MCNC: 1.1 MMOL/L — SIGNIFICANT CHANGE UP (ref 0.5–2)
PCO2 BLDV: 41 MMHG — SIGNIFICANT CHANGE UP (ref 39–42)
PH BLDV: 7.36 — SIGNIFICANT CHANGE UP (ref 7.32–7.43)
PO2 BLDV: 43 MMHG — SIGNIFICANT CHANGE UP (ref 25–45)
POTASSIUM BLDV-SCNC: 4.4 MMOL/L — SIGNIFICANT CHANGE UP (ref 3.5–5.1)
SAO2 % BLDV: 73.3 % — SIGNIFICANT CHANGE UP (ref 67–88)
SARS-COV-2 N GENE NPH QL NAA+PROBE: NOT DETECTED

## 2023-02-06 PROCEDURE — 88304 TISSUE EXAM BY PATHOLOGIST: CPT | Mod: 26

## 2023-02-06 PROCEDURE — 47563 LAPARO CHOLECYSTECTOMY/GRAPH: CPT

## 2023-02-06 DEVICE — SURGICEL POWDER 3 GRAMS: Type: IMPLANTABLE DEVICE | Status: FUNCTIONAL

## 2023-02-06 DEVICE — LIGATING CLIPS WECK HEMOLOK POLYMER MEDIUM-LARGE (GREEN) 6: Type: IMPLANTABLE DEVICE | Status: FUNCTIONAL

## 2023-02-06 RX ORDER — SODIUM CHLORIDE 9 MG/ML
1000 INJECTION, SOLUTION INTRAVENOUS
Refills: 0 | Status: DISCONTINUED | OUTPATIENT
Start: 2023-02-06 | End: 2023-02-20

## 2023-02-06 RX ORDER — ACETAMINOPHEN 500 MG
1000 TABLET ORAL ONCE
Refills: 0 | Status: COMPLETED | OUTPATIENT
Start: 2023-02-06 | End: 2023-02-06

## 2023-02-06 RX ORDER — CHLORHEXIDINE GLUCONATE 213 G/1000ML
1 SOLUTION TOPICAL ONCE
Refills: 0 | Status: DISCONTINUED | OUTPATIENT
Start: 2023-02-06 | End: 2023-02-06

## 2023-02-06 RX ORDER — LABETALOL HCL 100 MG
10 TABLET ORAL
Refills: 0 | Status: DISCONTINUED | OUTPATIENT
Start: 2023-02-06 | End: 2023-02-20

## 2023-02-06 RX ORDER — RIVAROXABAN 15 MG-20MG
1 KIT ORAL
Qty: 0 | Refills: 0 | DISCHARGE

## 2023-02-06 RX ORDER — ONDANSETRON 8 MG/1
4 TABLET, FILM COATED ORAL ONCE
Refills: 0 | Status: DISCONTINUED | OUTPATIENT
Start: 2023-02-06 | End: 2023-02-07

## 2023-02-06 RX ORDER — HYDRALAZINE HCL 50 MG
5 TABLET ORAL ONCE
Refills: 0 | Status: COMPLETED | OUTPATIENT
Start: 2023-02-06 | End: 2023-02-06

## 2023-02-06 RX ORDER — CHOLECALCIFEROL (VITAMIN D3) 125 MCG
1 CAPSULE ORAL
Qty: 0 | Refills: 0 | DISCHARGE

## 2023-02-06 RX ORDER — ROSUVASTATIN CALCIUM 5 MG/1
1 TABLET ORAL
Qty: 0 | Refills: 0 | DISCHARGE

## 2023-02-06 RX ORDER — OXYCODONE HYDROCHLORIDE 5 MG/1
1 TABLET ORAL
Qty: 6 | Refills: 0
Start: 2023-02-06

## 2023-02-06 RX ORDER — LEVOTHYROXINE SODIUM 125 MCG
112 TABLET ORAL EVERY 24 HOURS
Refills: 0 | Status: DISCONTINUED | OUTPATIENT
Start: 2023-02-06 | End: 2023-02-20

## 2023-02-06 RX ORDER — ENOXAPARIN SODIUM 100 MG/ML
40 INJECTION SUBCUTANEOUS EVERY 24 HOURS
Refills: 0 | Status: DISCONTINUED | OUTPATIENT
Start: 2023-02-06 | End: 2023-02-20

## 2023-02-06 RX ORDER — OXYCODONE HYDROCHLORIDE 5 MG/1
2.5 TABLET ORAL EVERY 4 HOURS
Refills: 0 | Status: DISCONTINUED | OUTPATIENT
Start: 2023-02-06 | End: 2023-02-06

## 2023-02-06 RX ORDER — OXYCODONE HYDROCHLORIDE 5 MG/1
5 TABLET ORAL EVERY 4 HOURS
Refills: 0 | Status: DISCONTINUED | OUTPATIENT
Start: 2023-02-06 | End: 2023-02-06

## 2023-02-06 RX ORDER — HYDROMORPHONE HYDROCHLORIDE 2 MG/ML
0.25 INJECTION INTRAMUSCULAR; INTRAVENOUS; SUBCUTANEOUS
Refills: 0 | Status: DISCONTINUED | OUTPATIENT
Start: 2023-02-06 | End: 2023-02-07

## 2023-02-06 RX ORDER — METOPROLOL TARTRATE 50 MG
1 TABLET ORAL
Qty: 0 | Refills: 0 | DISCHARGE

## 2023-02-06 RX ORDER — ACETAMINOPHEN 500 MG
650 TABLET ORAL EVERY 6 HOURS
Refills: 0 | Status: DISCONTINUED | OUTPATIENT
Start: 2023-02-06 | End: 2023-02-20

## 2023-02-06 RX ORDER — LEVOTHYROXINE SODIUM 125 MCG
1 TABLET ORAL
Qty: 0 | Refills: 0 | DISCHARGE

## 2023-02-06 RX ORDER — SODIUM CHLORIDE 9 MG/ML
3 INJECTION INTRAMUSCULAR; INTRAVENOUS; SUBCUTANEOUS EVERY 8 HOURS
Refills: 0 | Status: DISCONTINUED | OUTPATIENT
Start: 2023-02-06 | End: 2023-02-06

## 2023-02-06 RX ORDER — METOPROLOL TARTRATE 50 MG
25 TABLET ORAL DAILY
Refills: 0 | Status: DISCONTINUED | OUTPATIENT
Start: 2023-02-06 | End: 2023-02-20

## 2023-02-06 RX ADMIN — Medication 400 MILLIGRAM(S): at 15:56

## 2023-02-06 RX ADMIN — Medication 25 MILLIGRAM(S): at 16:51

## 2023-02-06 RX ADMIN — HYDROMORPHONE HYDROCHLORIDE 0.25 MILLIGRAM(S): 2 INJECTION INTRAMUSCULAR; INTRAVENOUS; SUBCUTANEOUS at 11:38

## 2023-02-06 RX ADMIN — HYDROMORPHONE HYDROCHLORIDE 0.25 MILLIGRAM(S): 2 INJECTION INTRAMUSCULAR; INTRAVENOUS; SUBCUTANEOUS at 16:59

## 2023-02-06 RX ADMIN — HYDROMORPHONE HYDROCHLORIDE 0.25 MILLIGRAM(S): 2 INJECTION INTRAMUSCULAR; INTRAVENOUS; SUBCUTANEOUS at 11:53

## 2023-02-06 RX ADMIN — OXYCODONE HYDROCHLORIDE 5 MILLIGRAM(S): 5 TABLET ORAL at 20:55

## 2023-02-06 RX ADMIN — ENOXAPARIN SODIUM 40 MILLIGRAM(S): 100 INJECTION SUBCUTANEOUS at 19:00

## 2023-02-06 RX ADMIN — Medication 5 MILLIGRAM(S): at 14:04

## 2023-02-06 RX ADMIN — OXYCODONE HYDROCHLORIDE 5 MILLIGRAM(S): 5 TABLET ORAL at 20:25

## 2023-02-06 RX ADMIN — HYDROMORPHONE HYDROCHLORIDE 0.25 MILLIGRAM(S): 2 INJECTION INTRAMUSCULAR; INTRAVENOUS; SUBCUTANEOUS at 17:24

## 2023-02-06 NOTE — ASU PREOP CHECKLIST - BOWEL PREP
Caller would like to discuss an/a Return call. Writer advised caller of callback within 24-72 hours.    Patient Name: Chelsy Angel  Caller Name: Alicia   Name of Facility: [facility], RN   Callback Number: [phone]  Best Availability: anytime   Can A Detailed Message Be left? yes  Fax Number:   Additional Info: Alicia called and wanted let Dr. Du know that patient's son Darian and daughter in- law Arline were concerned about patient's wheezing.  Alicia did a lung assessment today and patients lungs are clear, diminished at base, and is at 98% using 2.5 liters of oxygen.  Alicia would like to know if a nebulizer treatment or inhaler should be ordered.  Also patients edema is baseline per caregivers, and weight is stable.  Did you confirm the message with the caller?: yes    Thank you,  Erika Wiggins     n/a

## 2023-02-06 NOTE — PRE-ANESTHESIA EVALUATION ADULT - NSANTHAIRWAYFT_ENT_ALL_CORE
FROM FROM  TM distance <2 finger breadths  Neck circumference <16cm  Interincisor distance >2 finger breadths  Various molars missing

## 2023-02-06 NOTE — ASU PATIENT PROFILE, ADULT - ABILITY TO HEAR (WITH HEARING AID OR HEARING APPLIANCE IF NORMALLY USED):
hearing aids left home/Mildly to Moderately Impaired: difficulty hearing in some environments or speaker may need to increase volume or speak distinctly

## 2023-02-06 NOTE — ASU DISCHARGE PLAN (ADULT/PEDIATRIC) - ASU DC SPECIAL INSTRUCTIONSFT
You may shower in 24 hours.  Do not remove steri strips.  Do not let water hit wound directly, do not rub incision.      Do not drive for 24 hours following anesthesia.  Do not drive while taking narcotic pain medication.  Do not drive until pain free.       You may resume your usual daily diet as tolerated.      No exercise, strenuous physical activity, or heavy lifting (nothing heavier than 5 lbs).      You may perform your usual daily activities as tolerated (e.g. walking, stairclimbing, etc.).      Take Oxycodone as prescribed for severe pain.  You may take over-the-counter tylenol for mild to moderate pain    Follow-up with Dr. Alonso in his office in 2 weeks - call office for appointment if not already made. You may shower in 24 hours.  Do not remove steri strips.  Do not let water hit wound directly, do not rub incision.      Do not drive for 24 hours following anesthesia.  Do not drive while taking narcotic pain medication.  Do not drive until pain free.       You may resume your usual daily diet as tolerated.      No exercise, strenuous physical activity, or heavy lifting (nothing heavier than 5 lbs).      You may perform your usual daily activities as tolerated (e.g. walking, stairclimbing, etc.).      Take Oxycodone as prescribed for severe pain.  You may take over-the-counter tylenol for mild to moderate pain    Follow-up with Dr. Alonso in his office in 2 weeks - call office for appointment if not already made.    *please restart xarelto on wed 2/8/23*

## 2023-02-06 NOTE — BRIEF OPERATIVE NOTE - OPERATION/FINDINGS
lap cholecystectomy. critical view of safety obtained. cystic duct and artery identified, clipped with yaya locks and divided. hemostasis achieved.

## 2023-02-06 NOTE — ASU DISCHARGE PLAN (ADULT/PEDIATRIC) - NS MD DC FALL RISK RISK
For information on Fall & Injury Prevention, visit: [email or web address] OR  [email or web address] OR  https://www.cdc.gov/steadi/patient.html

## 2023-02-06 NOTE — ASU DISCHARGE PLAN (ADULT/PEDIATRIC) - CARE PROVIDER_API CALL
Harshad Alonso)  Surgery  [address], [address]  [place] [address]  Phone: [phone]  Fax: [phone]  Follow Up Time: 2 weeks

## 2023-02-06 NOTE — ASU PATIENT PROFILE, ADULT - FALL HARM RISK - HARM RISK INTERVENTIONS

## 2023-02-06 NOTE — PRE-ANESTHESIA EVALUATION ADULT - NSRADCARDRESULTSFT_GEN_ALL_CORE
PPM: 1/6/23. Battery lie> 13 years. Not pacemaker dependent.    TTE 3/17/21: EF 70%. Normal LV and RV function. No pulmonary HTN. No valvular abnormalities. PPM: 1/6/23. Battery life> 13 years. Not pacemaker dependent.    TTE 3/17/21: EF 70%. Normal LV and RV function. No pulmonary HTN. No valvular abnormalities.

## 2023-02-06 NOTE — CHART NOTE - NSCHARTNOTEFT_GEN_A_CORE
Post Operative Note  Patient: LEONARDO GRIFFITH 89y (28-Apr-1931) Female   MRN: [id number]  Location: [facility] PACU 24  Visit: 02-06-23 Outpatient  Date: 02-06-23 @ 17:08    Procedure: S/P laparoscopic cholecystectomy     Subjective:   Seen and examined 4hrs postop. Patient retaining about 1L of urine in the PACU and hypertensive. Received a one time dose of labetalol. No nausea or vomiting.     Objective:  Vitals: T(F): 97.2 (02-06-23 @ 14:00), Max: 97.9 (02-06-23 @ 06:00)  HR: 72 (02-06-23 @ 17:00)  BP: 170/84 (02-06-23 @ 17:00) (135/64 - 189/79)  RR: 16 (02-06-23 @ 17:00)  SpO2: 100% (02-06-23 @ 17:00)  Vent Settings:     In:   02-06-23 @ 07:01  -  02-06-23 @ 17:08  --------------------------------------------------------  IN: 500 mL      IV Fluids: lactated ringers. 1000 milliLiter(s) (40 mL/Hr) IV Continuous <Continuous>      Out:   02-06-23 @ 07:01  -  02-06-23 @ 17:08  --------------------------------------------------------  OUT: 0 mL      EBL: 20cc    Voided Urine:   02-06-23 @ 07:01  -  02-06-23 @ 17:08  --------------------------------------------------------  OUT: 0 mL      Begum Catheter: no       Physical Examination:  General: NAD, sitting on chair   HEENT: Normocephalic atraumatic  Respiratory: Nonlabored respirations, normal CW expansion.  Cardio: regular rate and rhythm.  Abdomen: softly distended, appropriately tender, surgical incisions are c/d/i  Vascular: extremities are warm and well perfused.     Imaging:  No post-op imaging studies    Assessment:  89yFemale patient S/P lap brittany for cholelithiasis with choledocholithiasis     Plan:  - IV Abx: periop  - Pain control PRN  - Diet: Reg   - Activity: as tolerated   - DVT ppx: LVX   - AM labs, replete prn   - 23 hrs PACU stay for observation    d/w Dr. Alonso and PACU team     [facility]   [id number]

## 2023-02-07 VITALS
TEMPERATURE: 98 F | SYSTOLIC BLOOD PRESSURE: 149 MMHG | RESPIRATION RATE: 18 BRPM | HEART RATE: 63 BPM | DIASTOLIC BLOOD PRESSURE: 77 MMHG | OXYGEN SATURATION: 98 %

## 2023-02-07 LAB
ALBUMIN SERPL ELPH-MCNC: 3.5 G/DL — SIGNIFICANT CHANGE UP (ref 3.3–5)
ALP SERPL-CCNC: 81 U/L — SIGNIFICANT CHANGE UP (ref 40–120)
ALT FLD-CCNC: 57 U/L — HIGH (ref 10–45)
ANION GAP SERPL CALC-SCNC: 12 MMOL/L — SIGNIFICANT CHANGE UP (ref 5–17)
AST SERPL-CCNC: 41 U/L — HIGH (ref 10–40)
BILIRUB SERPL-MCNC: 1.2 MG/DL — SIGNIFICANT CHANGE UP (ref 0.2–1.2)
BUN SERPL-MCNC: 19 MG/DL — SIGNIFICANT CHANGE UP (ref 7–23)
CALCIUM SERPL-MCNC: 9.3 MG/DL — SIGNIFICANT CHANGE UP (ref 8.4–10.5)
CHLORIDE SERPL-SCNC: 105 MMOL/L — SIGNIFICANT CHANGE UP (ref 96–108)
CO2 SERPL-SCNC: 21 MMOL/L — LOW (ref 22–31)
CREAT SERPL-MCNC: 1.09 MG/DL — SIGNIFICANT CHANGE UP (ref 0.5–1.3)
EGFR: 48 ML/MIN/1.73M2 — LOW
GLUCOSE SERPL-MCNC: 117 MG/DL — HIGH (ref 70–99)
HCT VFR BLD CALC: 41.2 % — SIGNIFICANT CHANGE UP (ref 34.5–45)
HGB BLD-MCNC: 12.9 G/DL — SIGNIFICANT CHANGE UP (ref 11.5–15.5)
MAGNESIUM SERPL-MCNC: 1.8 MG/DL — SIGNIFICANT CHANGE UP (ref 1.6–2.6)
MCHC RBC-ENTMCNC: 24.6 PG — LOW (ref 27–34)
MCHC RBC-ENTMCNC: 31.3 GM/DL — LOW (ref 32–36)
MCV RBC AUTO: 78.5 FL — LOW (ref 80–100)
NRBC # BLD: 0 /100 WBCS — SIGNIFICANT CHANGE UP (ref 0–0)
PHOSPHATE SERPL-MCNC: 3.1 MG/DL — SIGNIFICANT CHANGE UP (ref 2.5–4.5)
PLATELET # BLD AUTO: 195 K/UL — SIGNIFICANT CHANGE UP (ref 150–400)
POTASSIUM SERPL-MCNC: 4.7 MMOL/L — SIGNIFICANT CHANGE UP (ref 3.5–5.3)
POTASSIUM SERPL-SCNC: 4.7 MMOL/L — SIGNIFICANT CHANGE UP (ref 3.5–5.3)
PROT SERPL-MCNC: 6.6 G/DL — SIGNIFICANT CHANGE UP (ref 6–8.3)
RBC # BLD: 5.25 M/UL — HIGH (ref 3.8–5.2)
RBC # FLD: 14.4 % — SIGNIFICANT CHANGE UP (ref 10.3–14.5)
SODIUM SERPL-SCNC: 138 MMOL/L — SIGNIFICANT CHANGE UP (ref 135–145)
WBC # BLD: 8.27 K/UL — SIGNIFICANT CHANGE UP (ref 3.8–10.5)
WBC # FLD AUTO: 8.27 K/UL — SIGNIFICANT CHANGE UP (ref 3.8–10.5)

## 2023-02-07 PROCEDURE — 82803 BLOOD GASES ANY COMBINATION: CPT

## 2023-02-07 PROCEDURE — 84132 ASSAY OF SERUM POTASSIUM: CPT

## 2023-02-07 PROCEDURE — 85018 HEMOGLOBIN: CPT

## 2023-02-07 PROCEDURE — 84295 ASSAY OF SERUM SODIUM: CPT

## 2023-02-07 PROCEDURE — 83605 ASSAY OF LACTIC ACID: CPT

## 2023-02-07 PROCEDURE — 36415 COLL VENOUS BLD VENIPUNCTURE: CPT

## 2023-02-07 PROCEDURE — 88304 TISSUE EXAM BY PATHOLOGIST: CPT

## 2023-02-07 PROCEDURE — 84100 ASSAY OF PHOSPHORUS: CPT

## 2023-02-07 PROCEDURE — 85027 COMPLETE CBC AUTOMATED: CPT

## 2023-02-07 PROCEDURE — 80053 COMPREHEN METABOLIC PANEL: CPT

## 2023-02-07 PROCEDURE — 85014 HEMATOCRIT: CPT

## 2023-02-07 PROCEDURE — 82435 ASSAY OF BLOOD CHLORIDE: CPT

## 2023-02-07 PROCEDURE — 82947 ASSAY GLUCOSE BLOOD QUANT: CPT

## 2023-02-07 PROCEDURE — 83735 ASSAY OF MAGNESIUM: CPT

## 2023-02-07 PROCEDURE — 82330 ASSAY OF CALCIUM: CPT

## 2023-02-07 PROCEDURE — 47563 LAPARO CHOLECYSTECTOMY/GRAPH: CPT

## 2023-02-07 PROCEDURE — C1889: CPT

## 2023-02-07 RX ORDER — MAGNESIUM SULFATE 500 MG/ML
2 VIAL (ML) INJECTION ONCE
Refills: 0 | Status: COMPLETED | OUTPATIENT
Start: 2023-02-07 | End: 2023-02-07

## 2023-02-07 RX ADMIN — Medication 112 MICROGRAM(S): at 06:43

## 2023-02-07 RX ADMIN — Medication 650 MILLIGRAM(S): at 00:06

## 2023-02-07 RX ADMIN — Medication 25 GRAM(S): at 08:57

## 2023-02-07 RX ADMIN — SODIUM CHLORIDE 40 MILLILITER(S): 9 INJECTION, SOLUTION INTRAVENOUS at 00:06

## 2023-02-07 RX ADMIN — Medication 650 MILLIGRAM(S): at 06:51

## 2023-02-07 RX ADMIN — Medication 650 MILLIGRAM(S): at 06:43

## 2023-02-07 RX ADMIN — Medication 650 MILLIGRAM(S): at 00:20

## 2023-02-07 NOTE — PROGRESS NOTE ADULT - SUBJECTIVE AND OBJECTIVE BOX
Overnight events:   - No acute events    SUBJECTIVE:  Patient was seen and examined on AM rounds.    OBJECTIVE:  Vital Signs Last 24 Hrs  T(C): 36.5 (07 Feb 2023 02:00), Max: 36.6 (06 Feb 2023 06:00)  T(F): 97.7 (07 Feb 2023 02:00), Max: 97.9 (06 Feb 2023 06:00)  HR: 58 (07 Feb 2023 02:00) (50 - 115)  BP: 140/67 (07 Feb 2023 02:00) (116/70 - 189/79)  BP(mean): 95 (07 Feb 2023 02:00) (87 - 124)  RR: 16 (07 Feb 2023 02:00) (12 - 19)  SpO2: 98% (07 Feb 2023 02:00) (90% - 100%)    Parameters below as of 07 Feb 2023 02:00  Patient On (Oxygen Delivery Method): room air      02-06-23 @ 07:01  -  02-07-23 @ 04:06  --------------------------------------------------------  IN: 940 mL / OUT: 400 mL / NET: 540 mL      Physical Examination:  General: NAD, sitting on chair   HEENT: Normocephalic atraumatic  Respiratory: Nonlabored respirations, normal CW expansion.  Cardio: regular rate and rhythm.  Abdomen: softly distended, appropriately tender, surgical incisions are c/d/i  Vascular: extremities are warm and well perfused.         LABS:               Overnight events:   - No acute events    SUBJECTIVE:  Patient was seen and examined on AM rounds. Patient feeling well. Denies fevers/chills, chest pain, palpitations, SOB, N/V    OBJECTIVE:  Vital Signs Last 24 Hrs  T(C): 36.5 (07 Feb 2023 02:00), Max: 36.6 (06 Feb 2023 06:00)  T(F): 97.7 (07 Feb 2023 02:00), Max: 97.9 (06 Feb 2023 06:00)  HR: 58 (07 Feb 2023 02:00) (50 - 115)  BP: 140/67 (07 Feb 2023 02:00) (116/70 - 189/79)  BP(mean): 95 (07 Feb 2023 02:00) (87 - 124)  RR: 16 (07 Feb 2023 02:00) (12 - 19)  SpO2: 98% (07 Feb 2023 02:00) (90% - 100%)    Parameters below as of 07 Feb 2023 02:00  Patient On (Oxygen Delivery Method): room air      02-06-23 @ 07:01  -  02-07-23 @ 04:06  --------------------------------------------------------  IN: 940 mL / OUT: 400 mL / NET: 540 mL      Physical Examination:  General: NAD, sitting on chair   HEENT: Normocephalic atraumatic  Respiratory: Nonlabored respirations, normal CW expansion.  Cardio: regular rate and rhythm.  Abdomen: softly distended, appropriately tender, surgical incisions are c/d/i  Vascular: extremities are warm and well perfused.         LABS:               12.9   8.27  )-----------( 195      ( 07 Feb 2023 06:39 )             41.2       02-07    138  |  105  |  19  ----------------------------<  117<H>  4.7   |  21<L>  |  1.09    Ca    9.3      07 Feb 2023 06:39  Phos  3.1     02-07  Mg     1.8     02-07    TPro  6.6  /  Alb  3.5  /  TBili  1.2  /  DBili  x   /  AST  41<H>  /  ALT  57<H>  /  AlkPhos  81  02-07

## 2023-02-07 NOTE — PROGRESS NOTE ADULT - ASSESSMENT
89yFemale patient S/P lap brittany for cholelithiasis with choledocholithiasis     Plan:  - Pain control PRN  - Diet: Reg   - Activity: as tolerated   - DVT ppx: LVX   - AM labs  - 23 hrs PACU stay for observation     [facility]   [id number] 89yFemale patient S/P lap brittany for cholelithiasis with choledocholithiasis     Plan:  - Pain control PRN  - Diet: Reg   - Activity: as tolerated   - DVT ppx: LVX   - AM labs  - 23 hrs PACU stay for observation; pt to be discharged later today (2/7)    [facility]   [id number]

## 2023-02-07 NOTE — PROGRESS NOTE ADULT - ATTENDING COMMENTS
Patient seen/examined.  Agree w above note and plan and have discussed plan w house staff.  Comfortable, abeomen soft.  Diet, Darrel lucia MD

## 2023-02-16 LAB — SURGICAL PATHOLOGY STUDY: SIGNIFICANT CHANGE UP

## 2023-02-22 ENCOUNTER — APPOINTMENT (OUTPATIENT)
Dept: SURGERY | Facility: CLINIC | Age: 88
End: 2023-02-22
Payer: MEDICARE

## 2023-02-22 VITALS
RESPIRATION RATE: 17 BRPM | TEMPERATURE: 97.3 F | OXYGEN SATURATION: 98 % | SYSTOLIC BLOOD PRESSURE: 164 MMHG | BODY MASS INDEX: 28.51 KG/M2 | HEART RATE: 73 BPM | DIASTOLIC BLOOD PRESSURE: 88 MMHG | HEIGHT: 64 IN | WEIGHT: 167 LBS

## 2023-02-22 DIAGNOSIS — K80.20 CALCULUS OF GALLBLADDER W/OUT CHOLECYSTITIS W/OUT OBSTRUCTION: ICD-10-CM

## 2023-02-22 PROCEDURE — 99024 POSTOP FOLLOW-UP VISIT: CPT

## 2023-02-22 NOTE — HISTORY OF PRESENT ILLNESS
[de-identified] : LEONARDO is a 89 year old female here for s/p Laparoscopic cholecystectomy on 02/06/2023. [de-identified] : This patient has no complaints

## 2024-07-31 ENCOUNTER — APPOINTMENT (OUTPATIENT)
Dept: PULMONOLOGY | Facility: CLINIC | Age: 89
End: 2024-07-31
Payer: MEDICARE

## 2024-07-31 VITALS
OXYGEN SATURATION: 98 % | HEART RATE: 68 BPM | SYSTOLIC BLOOD PRESSURE: 160 MMHG | DIASTOLIC BLOOD PRESSURE: 85 MMHG | WEIGHT: 160 LBS

## 2024-07-31 DIAGNOSIS — R06.09 OTHER FORMS OF DYSPNEA: ICD-10-CM

## 2024-07-31 PROCEDURE — ZZZZZ: CPT

## 2024-07-31 PROCEDURE — 71046 X-RAY EXAM CHEST 2 VIEWS: CPT

## 2024-07-31 PROCEDURE — 99205 OFFICE O/P NEW HI 60 MIN: CPT | Mod: 25

## 2024-07-31 PROCEDURE — 94618 PULMONARY STRESS TESTING: CPT

## 2024-07-31 PROCEDURE — 94729 DIFFUSING CAPACITY: CPT

## 2024-07-31 PROCEDURE — 94727 GAS DIL/WSHOT DETER LNG VOL: CPT

## 2024-07-31 PROCEDURE — 94010 BREATHING CAPACITY TEST: CPT

## 2024-07-31 NOTE — HISTORY OF PRESENT ILLNESS
[Never] : never [TextBox_4] : 89F with PPM on ac here to [place]  no pulm hx never smoker noticed more sob when she walks down the doty in her apartment building or when she goes to [employer].  Dyspnea subsides with rest.  No associated palpitations or chest pain.  No cough or wheeze.  Had a stress test recently and told it was ok.

## 2024-07-31 NOTE — ASSESSMENT
[FreeTextEntry1] : likely 2/2 to kyphosis/deconditioning already on ac, VTE much less likely consider upright walker, increase exercise fu cards as well.  A total of 60 minutes was spent on this encounter including history taking, chart review, physical examination, testing interpretation and treatment plan.

## 2024-07-31 NOTE — PROCEDURE
[FreeTextEntry1] : PFT: restricted, low volumes, DLCO normal  CXR: clear lungs, no focal consolidation, cardiomegaly, PPM, no effusions, kyphosis.  exercise oximetry: no significant O2 desat with 6 min of walking on room air.

## 2024-08-26 NOTE — ED ADULT NURSE NOTE - NSICDXPASTMEDICALHX_GEN_ALL_CORE_FT
Mid sternal chest pain and sob on and off x 3 months ,increased pain since last night ,also reports all toes numbness x 3 months ,just arrived from [facility] days ago PAST MEDICAL HISTORY:  Asthma     Female Stress Incontinence     Hyperlipidemia     Hypertension     Hypothyroid     Lumbar Spinal Stenosis     Lumbosacral Neuritis     Osteoporosis     Osteoporosis     Pneumonia 11/10

## 2024-11-08 NOTE — ASU PATIENT PROFILE, ADULT - FALL HARM RISK - CONCLUSION
[facility] EMERGENCY DEPARTMENT  EMERGENCY DEPARTMENT ENCOUNTER        Pt Name: Fanny Gonzalez  MRN: [id number]  Birthdate 1950  Date of evaluation: 11/8/2024  Provider: Raj Llamas DO  PCP: Babatunde Storm APRN - CNP  Note Started: 2:01 PM EST 11/8/24    CHIEF COMPLAINT       Chief Complaint   Patient presents with    Abnormal Lab     PCP sent for high K        HISTORY OF PRESENT ILLNESS: 1 or more Elements   History From: Patient     Limitations to history : None    Fanny Gonzalez is a 74 y.o. female who presents with abnormal outpatient lab work.  Patient states she was told to come in as her potassium was 8.  She reports blood work was drawn yesterday at her PCPs office.  She reports this was a redraw from lab work drawn earlier in the week.  She is unclear of why she was having lab work drawn, thinks it may have been routine lab work but has also recently seen pulmonology and was told she had kidney problems.  States she is seeing a nephrologist before but is unsure who she has seen.  Has never been on dialysis.  She complains some mild ongoing shortness of breath but states it is not particularly worsened.  Does report decreased urinary output lately    Nursing Notes were all reviewed and agreed with or any disagreements were addressed in the HPI.      REVIEW OF EXTERNAL NOTE :       Review of records as well as records via care everywhere do not show any recent lab work within the last few months    Cardiology note from 8/30/2024 was seen in follow-up    Nephrology note with Dr. Garcia from 2/11/2020.    REVIEW OF SYSTEMS :           Positives and Pertinent negatives as per HPI.     SURGICAL HISTORY     Past Surgical History:   Procedure Laterality Date    ARTHROPLASTY Left 07/29/2016    thumb basil joint with dequervain's release    BREAST BIOPSY Right     BREAST LUMPECTOMY Right years ago    benign    BREAST LUMPECTOMY Right 09/06/2016    COLONOSCOPY  2005  Universal Safety Interventions

## (undated) DEVICE — NDL INJ SCLERO INTERJECT 25G

## (undated) DEVICE — GLV 6.5 PROTEXIS (WHITE)

## (undated) DEVICE — TUBING TRUWAVE PRESSURE MALE/FEMALE 72"

## (undated) DEVICE — PAPIL BILRTH II 6-5FRX0.035IN

## (undated) DEVICE — SUCTION YANKAUER NO CONTROL VENT

## (undated) DEVICE — SOL INJ NS 0.9% 500ML 2 PORT

## (undated) DEVICE — GLV 8 PROTEXIS (WHITE)

## (undated) DEVICE — BRUSH COLONOSCOPY CYTOLOGY

## (undated) DEVICE — INJ SYS RAP REFIL

## (undated) DEVICE — SHEARS COVIDIEN ENDO SHEAR 5MM X 31CM W UNIPOLAR CAUTERY

## (undated) DEVICE — FOLEY HOLDER STATLOCK 2 WAY ADULT

## (undated) DEVICE — CATH IV SAFE BC 22G X 1" (BLUE)

## (undated) DEVICE — PACK IV START WITH CHG

## (undated) DEVICE — ELCTR GROUNDING PAD ADULT COVIDIEN

## (undated) DEVICE — LIGASURE IMPACT

## (undated) DEVICE — MEDICATION LABELS W MARKER

## (undated) DEVICE — ABDOMINAL BINDER XL 9" X 62"-74"

## (undated) DEVICE — SYR LUER LOK 10CC

## (undated) DEVICE — DRSG TEGADERM 6"X8"

## (undated) DEVICE — NDL HYPO REGULAR BEVEL 22G X 1.5" (TURQUOISE)

## (undated) DEVICE — NDL INJ SCLERO INTERJECT 23G

## (undated) DEVICE — TUBING IV SET GRAVITY 3Y 100" MACRO

## (undated) DEVICE — TUBING STRYKER PNEUMOSURE HI FLOW RTP

## (undated) DEVICE — SUT POLYSORB 0 36" GU-46

## (undated) DEVICE — SNARE POLYP SENS SM 13MM 240CM

## (undated) DEVICE — PACK MAJOR ABDOMINAL SUPINE

## (undated) DEVICE — SNARE POLYP MINI ACCUSNR 1.5 X 3CM

## (undated) DEVICE — BRUSH CYTO RAP EXCHG 3MM

## (undated) DEVICE — SPHINCTEROTOME CLEVERCUT WIRE 25MM  2.8MM X 170CM

## (undated) DEVICE — DEVICE GRASPING RAPTOR

## (undated) DEVICE — POSITIONER FOAM EGG CRATE ULNAR 2PCS (PINK)

## (undated) DEVICE — DRSG OPSITE 2.5 X 2"

## (undated) DEVICE — DRAPE C ARM UNIVERSAL

## (undated) DEVICE — LONE STAR RETRACTOR SQUARE 14.1CMX14.1CM DISP

## (undated) DEVICE — ABDOMINAL BINDER MED/LG 12" X 45"-62"

## (undated) DEVICE — BITE BLOCK ADULT 20 X 27MM (GREEN)

## (undated) DEVICE — D HELP - CLEARVIEW CLEARIFY SYSTEM

## (undated) DEVICE — DRAPE INSTRUMENT POUCH 6.75" X 11"

## (undated) DEVICE — APPLICATOR SURGICEL LAP TROCAR POINT 2.5MM X 150MM

## (undated) DEVICE — SOL IRR POUR NS 0.9% 500ML

## (undated) DEVICE — BIOPSY FORCEP RADIAL JAW 4 STANDARD WITH NEEDLE

## (undated) DEVICE — POSITIONER FOAM HEAD CRADLE (PINK)

## (undated) DEVICE — WARMING BLANKET UPPER ADULT

## (undated) DEVICE — CATH IV SAFE BC 20G X 1.16" (PINK)

## (undated) DEVICE — STAPLER SKIN VISI-STAT 35 WIDE

## (undated) DEVICE — SOL IRR POUR H2O 250ML

## (undated) DEVICE — LITHOTRIPY BASKET TRAPEZOID 2.5CM

## (undated) DEVICE — GLV 7 PROTEXIS (WHITE)

## (undated) DEVICE — SUT BIOSYN 4-0 18" P-12

## (undated) DEVICE — GOWN TRIMAX LG

## (undated) DEVICE — SYR LUER LOK 20CC

## (undated) DEVICE — SENSOR O2 FINGER ADULT

## (undated) DEVICE — DRAPE TOWEL BLUE 17" X 24"

## (undated) DEVICE — NDL HYPO REGULAR BEVEL 25G X 1.5" (BLUE)

## (undated) DEVICE — COLONOSCOPE 2416901: Type: DURABLE MEDICAL EQUIPMENT

## (undated) DEVICE — TUBING SUCTION 20FT

## (undated) DEVICE — GLV 7.5 PROTEXIS (WHITE)

## (undated) DEVICE — ENDOCATCH 10MM SPECIMEN POUCH

## (undated) DEVICE — BLADE SCALPEL SAFETYLOCK #15

## (undated) DEVICE — TROCAR COVIDIEN VERSAPORT BLADELESS OPTICAL 5MM STANDARD

## (undated) DEVICE — MARKING PEN W RULER

## (undated) DEVICE — STAPLER COVIDIEN ENDO GIA SHORT HANDLE

## (undated) DEVICE — PACK ADVANCED LAPAROSCOPIC NS

## (undated) DEVICE — DRSG STERISTRIPS 0.5 X 4"

## (undated) DEVICE — LONE STAR ELASTIC STAY HOOK 5MM SHARP

## (undated) DEVICE — GLV 8.5 PROTEXIS (WHITE)

## (undated) DEVICE — DISSECTOR ENDO PEANUT 5MM

## (undated) DEVICE — BLADE SCALPEL SAFETYLOCK #10

## (undated) DEVICE — DRSG CURITY GAUZE SPONGE 4 X 4" 12-PLY

## (undated) DEVICE — SPECIMEN CONTAINER 100ML

## (undated) DEVICE — VENODYNE/SCD SLEEVE CALF LARGE

## (undated) DEVICE — TROCAR APPLIED MEDICAL KII BALLOON BLUNT TIP 12MM X 100MM